# Patient Record
Sex: MALE | Race: WHITE | NOT HISPANIC OR LATINO | Employment: PART TIME | URBAN - NONMETROPOLITAN AREA
[De-identification: names, ages, dates, MRNs, and addresses within clinical notes are randomized per-mention and may not be internally consistent; named-entity substitution may affect disease eponyms.]

---

## 2017-04-01 ENCOUNTER — HOSPITAL ENCOUNTER (EMERGENCY)
Facility: HOSPITAL | Age: 27
Discharge: HOME | End: 2017-04-01
Attending: EMERGENCY MEDICINE | Admitting: EMERGENCY MEDICINE

## 2017-04-01 ENCOUNTER — APPOINTMENT (OUTPATIENT)
Dept: CT IMAGING | Facility: HOSPITAL | Age: 27
End: 2017-04-01

## 2017-04-01 VITALS
BODY MASS INDEX: 42.66 KG/M2 | SYSTOLIC BLOOD PRESSURE: 145 MMHG | RESPIRATION RATE: 16 BRPM | HEART RATE: 92 BPM | HEIGHT: 72 IN | DIASTOLIC BLOOD PRESSURE: 78 MMHG | OXYGEN SATURATION: 98 % | TEMPERATURE: 98.2 F | WEIGHT: 315 LBS

## 2017-04-01 DIAGNOSIS — R31.9 HEMATURIA: ICD-10-CM

## 2017-04-01 DIAGNOSIS — R10.9 ACUTE LEFT FLANK PAIN: Primary | ICD-10-CM

## 2017-04-01 LAB
ALBUMIN SERPL-MCNC: 4.6 G/DL (ref 3.5–5)
ALBUMIN/GLOB SERPL: 1.2 G/DL (ref 1–2)
ALP SERPL-CCNC: 76 U/L (ref 38–126)
ALT SERPL W P-5'-P-CCNC: 49 U/L (ref 13–69)
ANION GAP SERPL CALCULATED.3IONS-SCNC: 14.1 MMOL/L
AST SERPL-CCNC: 33 U/L (ref 15–46)
BACTERIA UR QL AUTO: ABNORMAL /HPF
BASOPHILS # BLD AUTO: 0.06 10*3/MM3 (ref 0–0.2)
BASOPHILS NFR BLD AUTO: 0.7 % (ref 0–2.5)
BILIRUB SERPL-MCNC: 1.1 MG/DL (ref 0.2–1.3)
BILIRUB UR QL STRIP: NEGATIVE
BUN BLD-MCNC: 11 MG/DL (ref 7–20)
BUN/CREAT SERPL: 12.2 (ref 6.3–21.9)
CALCIUM SPEC-SCNC: 9.6 MG/DL (ref 8.4–10.2)
CHLORIDE SERPL-SCNC: 104 MMOL/L (ref 98–107)
CLARITY UR: CLEAR
CO2 SERPL-SCNC: 29 MMOL/L (ref 26–30)
COLOR UR: YELLOW
CREAT BLD-MCNC: 0.9 MG/DL (ref 0.6–1.3)
DEPRECATED RDW RBC AUTO: 38.5 FL (ref 37–54)
EOSINOPHIL # BLD AUTO: 0.12 10*3/MM3 (ref 0–0.7)
EOSINOPHIL NFR BLD AUTO: 1.4 % (ref 0–7)
ERYTHROCYTE [DISTWIDTH] IN BLOOD BY AUTOMATED COUNT: 12.6 % (ref 11.5–14.5)
GFR SERPL CREATININE-BSD FRML MDRD: 102 ML/MIN/1.73
GLOBULIN UR ELPH-MCNC: 3.8 GM/DL
GLUCOSE BLD-MCNC: 83 MG/DL (ref 74–98)
GLUCOSE UR STRIP-MCNC: NEGATIVE MG/DL
HCT VFR BLD AUTO: 42.6 % (ref 42–52)
HGB BLD-MCNC: 14.8 G/DL (ref 14–18)
HGB UR QL STRIP.AUTO: ABNORMAL
HYALINE CASTS UR QL AUTO: ABNORMAL /LPF
IMM GRANULOCYTES # BLD: 0.04 10*3/MM3 (ref 0–0.06)
IMM GRANULOCYTES NFR BLD: 0.5 % (ref 0–0.6)
KETONES UR QL STRIP: NEGATIVE
LEUKOCYTE ESTERASE UR QL STRIP.AUTO: NEGATIVE
LYMPHOCYTES # BLD AUTO: 1.48 10*3/MM3 (ref 0.6–3.4)
LYMPHOCYTES NFR BLD AUTO: 16.9 % (ref 10–50)
MCH RBC QN AUTO: 29.5 PG (ref 27–31)
MCHC RBC AUTO-ENTMCNC: 34.7 G/DL (ref 30–37)
MCV RBC AUTO: 84.9 FL (ref 80–94)
MONOCYTES # BLD AUTO: 0.88 10*3/MM3 (ref 0–0.9)
MONOCYTES NFR BLD AUTO: 10 % (ref 0–12)
NEUTROPHILS # BLD AUTO: 6.2 10*3/MM3 (ref 2–6.9)
NEUTROPHILS NFR BLD AUTO: 70.5 % (ref 37–80)
NITRITE UR QL STRIP: NEGATIVE
NRBC BLD MANUAL-RTO: 0 /100 WBC (ref 0–0)
PH UR STRIP.AUTO: 8.5 [PH] (ref 5–8)
PLATELET # BLD AUTO: 264 10*3/MM3 (ref 130–400)
PMV BLD AUTO: 9.3 FL (ref 6–12)
POTASSIUM BLD-SCNC: 4.1 MMOL/L (ref 3.5–5.1)
PROT SERPL-MCNC: 8.4 G/DL (ref 6.3–8.2)
PROT UR QL STRIP: ABNORMAL
RBC # BLD AUTO: 5.02 10*6/MM3 (ref 4.7–6.1)
RBC # UR: ABNORMAL /HPF
REF LAB TEST METHOD: ABNORMAL
SODIUM BLD-SCNC: 143 MMOL/L (ref 137–145)
SP GR UR STRIP: 1.02 (ref 1–1.03)
SQUAMOUS #/AREA URNS HPF: ABNORMAL /HPF
UROBILINOGEN UR QL STRIP: ABNORMAL
WBC NRBC COR # BLD: 8.78 10*3/MM3 (ref 4.8–10.8)
WBC UR QL AUTO: ABNORMAL /HPF

## 2017-04-01 PROCEDURE — 99283 EMERGENCY DEPT VISIT LOW MDM: CPT

## 2017-04-01 PROCEDURE — 85025 COMPLETE CBC W/AUTO DIFF WBC: CPT | Performed by: PHYSICIAN ASSISTANT

## 2017-04-01 PROCEDURE — 74176 CT ABD & PELVIS W/O CONTRAST: CPT

## 2017-04-01 PROCEDURE — 25010000002 KETOROLAC TROMETHAMINE PER 15 MG: Performed by: PHYSICIAN ASSISTANT

## 2017-04-01 PROCEDURE — 80053 COMPREHEN METABOLIC PANEL: CPT | Performed by: PHYSICIAN ASSISTANT

## 2017-04-01 PROCEDURE — 96361 HYDRATE IV INFUSION ADD-ON: CPT

## 2017-04-01 PROCEDURE — 81001 URINALYSIS AUTO W/SCOPE: CPT | Performed by: EMERGENCY MEDICINE

## 2017-04-01 PROCEDURE — 96374 THER/PROPH/DIAG INJ IV PUSH: CPT

## 2017-04-01 RX ORDER — SODIUM CHLORIDE 0.9 % (FLUSH) 0.9 %
10 SYRINGE (ML) INJECTION AS NEEDED
Status: DISCONTINUED | OUTPATIENT
Start: 2017-04-01 | End: 2017-04-01 | Stop reason: HOSPADM

## 2017-04-01 RX ORDER — KETOROLAC TROMETHAMINE 30 MG/ML
30 INJECTION, SOLUTION INTRAMUSCULAR; INTRAVENOUS ONCE
Status: COMPLETED | OUTPATIENT
Start: 2017-04-01 | End: 2017-04-01

## 2017-04-01 RX ORDER — ONDANSETRON 4 MG/1
4 TABLET, FILM COATED ORAL EVERY 6 HOURS PRN
Qty: 10 TABLET | Refills: 0 | Status: SHIPPED | OUTPATIENT
Start: 2017-04-01

## 2017-04-01 RX ORDER — NAPROXEN 500 MG/1
500 TABLET ORAL 2 TIMES DAILY PRN
Qty: 14 TABLET | Refills: 0 | Status: SHIPPED | OUTPATIENT
Start: 2017-04-01 | End: 2018-07-21

## 2017-04-01 RX ADMIN — SODIUM CHLORIDE 1000 ML: 9 INJECTION, SOLUTION INTRAVENOUS at 16:16

## 2017-04-01 RX ADMIN — KETOROLAC TROMETHAMINE 30 MG: 30 INJECTION, SOLUTION INTRAMUSCULAR at 16:16

## 2017-04-01 NOTE — DISCHARGE INSTRUCTIONS
Increase fluids, strain urine.  Return to the ER for uncontrolled pain, fever, vomiting.  Follow-up with Dr. Leon, urologist, in 2-3 days for further workup, treatment and evaluation.      Follow-up with your family doctor on Monday.

## 2017-04-01 NOTE — ED PROVIDER NOTES
Subjective   HPI Comments: No history of kidney stones.  Patient sent here for hematuria noted on urinalysis at urgent care as well as left flank pain.    Patient is a 26 y.o. male presenting with flank pain.   History provided by:  Patient  History limited by: nothing.   used: No    Flank Pain   Pain location:  L flank  Pain quality: sharp    Pain radiates to:  LUQ (Left groin region)  Pain severity:  Moderate  Onset quality:  Gradual  Timing:  Constant  Progression:  Waxing and waning  Chronicity:  New  Context: not recent illness    Relieved by:  Nothing  Worsened by:  Nothing  Ineffective treatments:  None tried  Associated symptoms: dysuria, hematuria and nausea    Associated symptoms: no chest pain        Review of Systems   Constitutional: Negative.    HENT: Negative.    Eyes: Negative.    Respiratory: Negative.    Cardiovascular: Negative.  Negative for chest pain.   Gastrointestinal: Positive for abdominal pain and nausea.   Endocrine: Negative.    Genitourinary: Positive for dysuria, flank pain and hematuria.   Skin: Negative.    Allergic/Immunologic: Negative.    Neurological: Negative.    Hematological: Negative.    Psychiatric/Behavioral: Negative.    All other systems reviewed and are negative.      History reviewed. No pertinent past medical history.    No Known Allergies    Past Surgical History:   Procedure Laterality Date   • NEPHRECTOMY PARTIAL      2 years old       Family History   Problem Relation Age of Onset   • Hypertension Mother    • Diabetes Mother    • Heart failure Mother    • Alzheimer's disease Mother        Social History     Social History   • Marital status: Single     Spouse name: N/A   • Number of children: N/A   • Years of education: N/A     Social History Main Topics   • Smoking status: Former Smoker     Quit date: 1/1/2017   • Smokeless tobacco: None   • Alcohol use No   • Drug use: No   • Sexual activity: Not Asked     Other Topics Concern   • None      Social History Narrative   • None           Objective   Physical Exam   Constitutional: He is oriented to person, place, and time. He appears well-developed and well-nourished. No distress.   HENT:   Head: Normocephalic and atraumatic.   Right Ear: External ear normal.   Left Ear: External ear normal.   Eyes: EOM are normal. Pupils are equal, round, and reactive to light.   Neck: Normal range of motion. Neck supple.   Cardiovascular: Normal rate, regular rhythm and normal heart sounds.    Pulmonary/Chest: Effort normal and breath sounds normal. No stridor. He has no wheezes. He exhibits no tenderness.   Abdominal: Soft. He exhibits no distension. There is no tenderness. There is no rebound and no guarding.   Left CVA tenderness noted.   Musculoskeletal: Normal range of motion. He exhibits no edema.   Neurological: He is alert and oriented to person, place, and time.   Skin: Skin is warm and dry. No rash noted. He is not diaphoretic.   Psychiatric: He has a normal mood and affect. His behavior is normal. Judgment and thought content normal.   Nursing note and vitals reviewed.      Procedures         ED Course  ED Course                  MDM  Number of Diagnoses or Management Options  Acute left flank pain: new and requires workup  Hematuria: new and requires workup     Amount and/or Complexity of Data Reviewed  Clinical lab tests: reviewed and ordered  Tests in the radiology section of CPT®: ordered and reviewed  Discuss the patient with other providers: yes    Risk of Complications, Morbidity, and/or Mortality  Presenting problems: moderate  Diagnostic procedures: low  Management options: moderate  General comments: CT scan report noted.  Likely, the patient has passed a ureter stone or has a non-radiopaque ureteral stone present, that hasn't passed yet.  We will have the patient strain his urine and if not improving, follow-up with Dr. Leon for further workup.  The patient verbalized understanding.    Patient  Progress  Patient progress: stable      Final diagnoses:   Acute left flank pain   Hematuria            Breanne Delacruz PA-C  04/01/17 185

## 2017-07-28 DIAGNOSIS — M25.571 RIGHT ANKLE PAIN, UNSPECIFIED CHRONICITY: Primary | ICD-10-CM

## 2017-08-02 ENCOUNTER — OFFICE VISIT (OUTPATIENT)
Dept: ORTHOPEDIC SURGERY | Facility: CLINIC | Age: 27
End: 2017-08-02

## 2017-08-02 VITALS — WEIGHT: 315 LBS | BODY MASS INDEX: 42.66 KG/M2 | HEIGHT: 72 IN | RESPIRATION RATE: 18 BRPM

## 2017-08-02 DIAGNOSIS — M25.571 RIGHT ANKLE PAIN, UNSPECIFIED CHRONICITY: Primary | ICD-10-CM

## 2017-08-02 PROCEDURE — 99203 OFFICE O/P NEW LOW 30 MIN: CPT | Performed by: ORTHOPAEDIC SURGERY

## 2017-08-02 RX ORDER — MELOXICAM 7.5 MG/1
15 TABLET ORAL DAILY
Qty: 60 TABLET | Refills: 2 | Status: SHIPPED | OUTPATIENT
Start: 2017-08-02

## 2017-08-02 NOTE — PROGRESS NOTES
Subjective   Patient ID: Akin De Oliveira is a 27 y.o. male  Pain and Edema of the Right Ankle (Patient denies any injury. Patient stated about two months ago began experiencing pain and swelling; also a large knot. 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  Used to play catcher remembers maybe injuring his ankle once in high school but no other injuries recently.  Denies other joint arthralgias.  Was treated the past for possible kidney stones.    Review of Systems   Constitutional: Negative for diaphoresis, fever and unexpected weight change.   HENT: Negative for dental problem and sore throat.    Eyes: Negative for visual disturbance.   Respiratory: Negative for shortness of breath.    Cardiovascular: Negative for chest pain.   Gastrointestinal: Negative for abdominal pain, constipation, diarrhea, nausea and vomiting.   Genitourinary: Negative for difficulty urinating and frequency.   Musculoskeletal: Positive for arthralgias.   Neurological: Negative for headaches.   Hematological: Does not bruise/bleed easily.   All other systems reviewed and are negative.      History reviewed. No pertinent past medical history.     Past Surgical History:   Procedure Laterality Date   • NEPHRECTOMY PARTIAL      2 years old left       Family History   Problem Relation Age of Onset   • Hypertension Mother    • Diabetes Mother    • Heart failure Mother    • Alzheimer's disease Mother        Social History     Social History   • Marital status: Single     Spouse name: N/A   • Number of children: N/A   • Years of education: N/A     Occupational History   • Not on file.     Social History Main Topics   • Smoking status: Former Smoker     Quit date: 1/1/2017   • Smokeless tobacco: Not on file   • Alcohol use No   • Drug  "use: No   • Sexual activity: Defer     Other Topics Concern   • Not on file     Social History Narrative       No Known Allergies    Objective   Resp 18  Ht 72\" (182.9 cm)  Wt (!) 350 lb 9.6 oz (159 kg)  BMI 47.55 kg/m2   Physical Exam  Constitutional: He is oriented to person, place, and time. He appears well-developed and well-nourished.   HENT:   Head: Normocephalic and atraumatic.   Eyes: EOM are normal. Pupils are equal, round, and reactive to light.   Neck: Normal range of motion. Neck supple.   Cardiovascular: Normal rate.    Pulmonary/Chest: Effort normal and breath sounds normal.   Abdominal: Soft.   Neurological: He is alert and oriented to person, place, and time.   Skin: Skin is warm and dry.   Psychiatric: He has a normal mood and affect.   Nursing note and vitals reviewed.       Ortho Exam   Some tenderness and swelling medial to the anterior tibialis tendon good motor control negative instability full range of motion skin appears normal Supple Achilles tendon nontender peroneal nerve and peroneal tendon function intact and normal no pain with subtalar range of motion    Assessment/Plan   Review of Radiographic Studies:    Radiographic images today of affected area I personally viewed and showed no sign of acute fracture or dislocation.      Procedures     Akin was seen today for pain and edema.    Diagnoses and all orders for this visit:    Right ankle pain, unspecified chronicity  -     MRI Ankle Right Without Contrast; Future  -     Ambulatory Referral to Physical Therapy Evaluate and treat    Other orders  -     meloxicam (MOBIC) 7.5 MG tablet; Take 2 tablets by mouth Daily.     Physical therapy referral given and Use brace as instructed      Recommendations/Plan:   Work/Activity Status: May perform usual activities as tolerated    Patient agreeable to call or return sooner for any concerns.             Impression:  Anteromedial right ankle pain probable tendinitis    Plan: Physical therapy, " lace up ankle support, Mobic, MRI right ankle

## 2018-07-21 ENCOUNTER — HOSPITAL ENCOUNTER (EMERGENCY)
Facility: HOSPITAL | Age: 28
Discharge: HOME | End: 2018-07-22
Attending: EMERGENCY MEDICINE | Admitting: EMERGENCY MEDICINE

## 2018-07-21 ENCOUNTER — APPOINTMENT (OUTPATIENT)
Dept: GENERAL RADIOLOGY | Facility: HOSPITAL | Age: 28
End: 2018-07-21

## 2018-07-21 DIAGNOSIS — R07.9 CHEST PAIN, UNSPECIFIED TYPE: Primary | ICD-10-CM

## 2018-07-21 LAB
ALBUMIN SERPL-MCNC: 4.7 G/DL (ref 3.5–5)
ALBUMIN/GLOB SERPL: 1.2 G/DL (ref 1–2)
ALP SERPL-CCNC: 79 U/L (ref 38–126)
ALT SERPL W P-5'-P-CCNC: 58 U/L (ref 13–69)
ANION GAP SERPL CALCULATED.3IONS-SCNC: 14.5 MMOL/L (ref 10–20)
AST SERPL-CCNC: 55 U/L (ref 15–46)
BASOPHILS # BLD AUTO: 0.08 10*3/MM3 (ref 0–0.2)
BASOPHILS NFR BLD AUTO: 0.7 % (ref 0–2.5)
BILIRUB SERPL-MCNC: 0.8 MG/DL (ref 0.2–1.3)
BUN BLD-MCNC: 13 MG/DL (ref 7–20)
BUN/CREAT SERPL: 16.3 (ref 6.3–21.9)
CALCIUM SPEC-SCNC: 9.5 MG/DL (ref 8.4–10.2)
CHLORIDE SERPL-SCNC: 103 MMOL/L (ref 98–107)
CO2 SERPL-SCNC: 26 MMOL/L (ref 26–30)
CREAT BLD-MCNC: 0.8 MG/DL (ref 0.6–1.3)
D-DIMER, QUANTITATIVE (MAD,POW, STR): 323 NG/ML (FEU) (ref 0–500)
DEPRECATED RDW RBC AUTO: 39.4 FL (ref 37–54)
EOSINOPHIL # BLD AUTO: 0.2 10*3/MM3 (ref 0–0.7)
EOSINOPHIL NFR BLD AUTO: 1.8 % (ref 0–7)
ERYTHROCYTE [DISTWIDTH] IN BLOOD BY AUTOMATED COUNT: 12.7 % (ref 11.5–14.5)
GFR SERPL CREATININE-BSD FRML MDRD: 116 ML/MIN/1.73
GLOBULIN UR ELPH-MCNC: 4 GM/DL
GLUCOSE BLD-MCNC: 90 MG/DL (ref 74–98)
HCT VFR BLD AUTO: 42.5 % (ref 42–52)
HGB BLD-MCNC: 14.6 G/DL (ref 14–18)
IMM GRANULOCYTES # BLD: 0.03 10*3/MM3 (ref 0–0.06)
IMM GRANULOCYTES NFR BLD: 0.3 % (ref 0–0.6)
LYMPHOCYTES # BLD AUTO: 2.17 10*3/MM3 (ref 0.6–3.4)
LYMPHOCYTES NFR BLD AUTO: 20 % (ref 10–50)
MCH RBC QN AUTO: 29.4 PG (ref 27–31)
MCHC RBC AUTO-ENTMCNC: 34.4 G/DL (ref 30–37)
MCV RBC AUTO: 85.7 FL (ref 80–94)
MONOCYTES # BLD AUTO: 1.14 10*3/MM3 (ref 0–0.9)
MONOCYTES NFR BLD AUTO: 10.5 % (ref 0–12)
NEUTROPHILS # BLD AUTO: 7.24 10*3/MM3 (ref 2–6.9)
NEUTROPHILS NFR BLD AUTO: 66.7 % (ref 37–80)
NRBC BLD MANUAL-RTO: 0 /100 WBC (ref 0–0)
PLATELET # BLD AUTO: 281 10*3/MM3 (ref 130–400)
PMV BLD AUTO: 9.5 FL (ref 6–12)
POTASSIUM BLD-SCNC: 4.5 MMOL/L (ref 3.5–5.1)
PROT SERPL-MCNC: 8.7 G/DL (ref 6.3–8.2)
RBC # BLD AUTO: 4.96 10*6/MM3 (ref 4.7–6.1)
SODIUM BLD-SCNC: 139 MMOL/L (ref 137–145)
TROPONIN I SERPL-MCNC: <0.012 NG/ML (ref 0–0.03)
WBC NRBC COR # BLD: 10.86 10*3/MM3 (ref 4.8–10.8)

## 2018-07-21 PROCEDURE — 99283 EMERGENCY DEPT VISIT LOW MDM: CPT

## 2018-07-21 PROCEDURE — 93005 ELECTROCARDIOGRAM TRACING: CPT

## 2018-07-21 PROCEDURE — 85025 COMPLETE CBC W/AUTO DIFF WBC: CPT | Performed by: EMERGENCY MEDICINE

## 2018-07-21 PROCEDURE — 96372 THER/PROPH/DIAG INJ SC/IM: CPT

## 2018-07-21 PROCEDURE — 25010000002 KETOROLAC TROMETHAMINE PER 15 MG: Performed by: EMERGENCY MEDICINE

## 2018-07-21 PROCEDURE — 80053 COMPREHEN METABOLIC PANEL: CPT | Performed by: EMERGENCY MEDICINE

## 2018-07-21 PROCEDURE — 85379 FIBRIN DEGRADATION QUANT: CPT | Performed by: EMERGENCY MEDICINE

## 2018-07-21 PROCEDURE — 71046 X-RAY EXAM CHEST 2 VIEWS: CPT

## 2018-07-21 PROCEDURE — 84484 ASSAY OF TROPONIN QUANT: CPT | Performed by: EMERGENCY MEDICINE

## 2018-07-21 RX ORDER — SODIUM CHLORIDE 0.9 % (FLUSH) 0.9 %
10 SYRINGE (ML) INJECTION AS NEEDED
Status: DISCONTINUED | OUTPATIENT
Start: 2018-07-21 | End: 2018-07-22 | Stop reason: HOSPADM

## 2018-07-21 RX ORDER — NAPROXEN 500 MG/1
500 TABLET ORAL 2 TIMES DAILY
Qty: 14 TABLET | Refills: 0 | Status: SHIPPED | OUTPATIENT
Start: 2018-07-21

## 2018-07-21 RX ORDER — KETOROLAC TROMETHAMINE 30 MG/ML
30 INJECTION, SOLUTION INTRAMUSCULAR; INTRAVENOUS ONCE
Status: COMPLETED | OUTPATIENT
Start: 2018-07-21 | End: 2018-07-21

## 2018-07-21 RX ADMIN — KETOROLAC TROMETHAMINE 30 MG: 30 INJECTION, SOLUTION INTRAMUSCULAR at 23:18

## 2018-07-22 VITALS
HEIGHT: 72 IN | TEMPERATURE: 98.4 F | WEIGHT: 315 LBS | SYSTOLIC BLOOD PRESSURE: 146 MMHG | RESPIRATION RATE: 18 BRPM | DIASTOLIC BLOOD PRESSURE: 97 MMHG | HEART RATE: 89 BPM | OXYGEN SATURATION: 99 % | BODY MASS INDEX: 42.66 KG/M2

## 2018-07-22 LAB
HOLD SPECIMEN: NORMAL
WHOLE BLOOD HOLD SPECIMEN: NORMAL
WHOLE BLOOD HOLD SPECIMEN: NORMAL

## 2019-09-04 ENCOUNTER — HOSPITAL ENCOUNTER (EMERGENCY)
Facility: HOSPITAL | Age: 29
Discharge: HOME | End: 2019-09-04
Attending: EMERGENCY MEDICINE | Admitting: EMERGENCY MEDICINE

## 2019-09-04 VITALS
HEART RATE: 104 BPM | BODY MASS INDEX: 42.66 KG/M2 | DIASTOLIC BLOOD PRESSURE: 76 MMHG | HEIGHT: 72 IN | SYSTOLIC BLOOD PRESSURE: 141 MMHG | RESPIRATION RATE: 19 BRPM | WEIGHT: 315 LBS | OXYGEN SATURATION: 97 % | TEMPERATURE: 98.2 F

## 2019-09-04 DIAGNOSIS — S81.011A KNEE LACERATION, RIGHT, INITIAL ENCOUNTER: Primary | ICD-10-CM

## 2019-09-04 PROCEDURE — 90471 IMMUNIZATION ADMIN: CPT | Performed by: EMERGENCY MEDICINE

## 2019-09-04 PROCEDURE — 90715 TDAP VACCINE 7 YRS/> IM: CPT | Performed by: EMERGENCY MEDICINE

## 2019-09-04 PROCEDURE — 25010000002 TDAP 5-2.5-18.5 LF-MCG/0.5 SUSPENSION: Performed by: EMERGENCY MEDICINE

## 2019-09-04 PROCEDURE — 99283 EMERGENCY DEPT VISIT LOW MDM: CPT

## 2019-09-04 RX ORDER — LIDOCAINE HYDROCHLORIDE AND EPINEPHRINE 10; 10 MG/ML; UG/ML
10 INJECTION, SOLUTION INFILTRATION; PERINEURAL ONCE
Status: COMPLETED | OUTPATIENT
Start: 2019-09-04 | End: 2019-09-04

## 2019-09-04 RX ADMIN — TETANUS TOXOID, REDUCED DIPHTHERIA TOXOID AND ACELLULAR PERTUSSIS VACCINE, ADSORBED 0.5 ML: 5; 2.5; 8; 8; 2.5 SUSPENSION INTRAMUSCULAR at 03:27

## 2019-09-04 RX ADMIN — LIDOCAINE HYDROCHLORIDE,EPINEPHRINE BITARTRATE 10 ML: 10; .01 INJECTION, SOLUTION INFILTRATION; PERINEURAL at 04:06

## 2019-09-04 NOTE — ED PROVIDER NOTES
Subjective   29-year-old male presents for evaluation of right knee laceration.  Approximately 2 hours ago, the patient was at work when he sustained an accidental laceration to the anterior aspect of his right knee on a metal cart.  Isolated injury.  No difficulty walking.  No paresthesias.  He was advised by his medical provider at work to come to the emergency department as he likely needed stitches.  Unknown tetanus status.        History provided by:  Patient  Laceration   Location:  Leg  Leg laceration location:  R knee  Bleeding: controlled    Time since incident:  2 hours  Injury mechanism: sharp edge of a seat.  Foreign body present:  No foreign bodies  Tetanus status:  Unknown      Review of Systems   Musculoskeletal: Negative for gait problem.   Skin: Positive for wound (laceration to the right knee).   All other systems reviewed and are negative.      No past medical history on file.    No Known Allergies    Past Surgical History:   Procedure Laterality Date   • NEPHRECTOMY PARTIAL      2 years old left       Family History   Problem Relation Age of Onset   • Hypertension Mother    • Diabetes Mother    • Heart failure Mother    • Alzheimer's disease Mother        Social History     Socioeconomic History   • Marital status: Single     Spouse name: Not on file   • Number of children: Not on file   • Years of education: Not on file   • Highest education level: Not on file   Tobacco Use   • Smoking status: Former Smoker     Last attempt to quit: 2017     Years since quittin.6   Substance and Sexual Activity   • Alcohol use: No   • Drug use: No   • Sexual activity: Defer         Objective   Physical Exam   Constitutional: He is oriented to person, place, and time. He appears well-developed and well-nourished. No distress.   Well-appearing male in no acute distress   HENT:   Head: Normocephalic and atraumatic.   Cardiovascular: Normal rate, regular rhythm and normal heart sounds. Exam reveals no gallop  and no friction rub.   No murmur heard.  Pulmonary/Chest: Effort normal and breath sounds normal. No respiratory distress. He has no wheezes. He has no rales.   Musculoskeletal: Normal range of motion.   Right lower extremity neurovascularly intact distally with bounding distal pulses normal sensation, normal range of motion of right knee   Neurological: He is alert and oriented to person, place, and time.   Normal gait   Skin: He is not diaphoretic.   Approximately 2 cm laceration noted to anterior aspect of right knee without active bleeding   Psychiatric: He has a normal mood and affect. Judgment and thought content normal.   Nursing note and vitals reviewed.      Laceration Repair  Date/Time: 9/4/2019 4:02 AM  Performed by: César Barrientos MD  Authorized by: César Barrientos MD     Consent:     Consent obtained:  Verbal    Consent given by:  Patient    Risks discussed:  Infection, need for additional repair, nerve damage, poor wound healing, poor cosmetic result, pain, vascular damage and tendon damage    Alternatives discussed:  No treatment  Anesthesia (see MAR for exact dosages):     Anesthesia method:  Local infiltration    Local anesthetic:  Lidocaine 1% WITH epi  Laceration details:     Location:  Leg    Leg location:  R knee    Length (cm):  2  Repair type:     Repair type:  Simple  Pre-procedure details:     Preparation:  Patient was prepped and draped in usual sterile fashion  Exploration:     Contaminated: no    Treatment:     Area cleansed with:  Saline    Amount of cleaning:  Extensive    Irrigation solution:  Sterile saline    Irrigation volume:  500    Irrigation method:  Syringe    Visualized foreign bodies/material removed: no    Skin repair:     Repair method:  Sutures    Suture size:  3-0    Suture material:  Nylon    Suture technique:  Simple interrupted    Number of sutures:  3  Approximation:     Approximation:  Close    Vermilion border: well-aligned    Post-procedure details:      "Dressing:  Antibiotic ointment    Patient tolerance of procedure:  Tolerated well, no immediate complications             ED Course  ED Course as of Sep 04 0402   Wed Sep 04, 2019   0337 29-year-old male presents for evaluation of accidental right knee laceration sustained approximately 2 hours prior to coming to the emergency department at work.  He accidentally cut it on a metal cart.  On arrival to the ED, patient well-appearing.  Tetanus updated.  Isolated injury.  Normal gait.  Right lower extremity neurovascularly intact.  We will copiously irrigate the patient's wound and repair with sutures.  Wound is superficial and clean.  No antibiotics indicated.  No imaging indicated.  [DD]   0401 Wound copiously irrigated and repaired without complication.  No antibiotics indicated.  Reassured and counseled regarding wound management.  The patient will follow up with his primary care physician in 10 to 14 days for suture removal.  Agreeable with plan and given appropriate strict return precautions.  [DD]      ED Course User Index  [DD] César Barrientos MD         Final Diagnosis: as of Sep 04 0402   Knee laceration, right, initial encounter       No results found for this or any previous visit (from the past 24 hour(s)).  Note: In addition to lab results from this visit, the labs listed above may include labs taken at another facility or during a different encounter within the last 24 hours. Please correlate lab times with ED admission and discharge times for further clarification of the services performed during this visit.    No orders to display     Vitals:    09/04/19 0241   BP: 141/76   BP Location: Left arm   Patient Position: Sitting   Pulse: 104   Resp: 19   Temp: 98.2 °F (36.8 °C)   TempSrc: Oral   SpO2: 97%   Weight: (!) 163 kg (360 lb)   Height: 182.9 cm (72\")     Medications   lidocaine-EPINEPHrine (XYLOCAINE W/EPI) 1 %-1:038138 injection 10 mL (not administered)   Tdap (BOOSTRIX) injection 0.5 mL (not " "administered)     ECG/EMG Results (last 24 hours)     ** No results found for the last 24 hours. **        No orders to display                 No results found for this or any previous visit (from the past 24 hour(s)).  Note: In addition to lab results from this visit, the labs listed above may include labs taken at another facility or during a different encounter within the last 24 hours. Please correlate lab times with ED admission and discharge times for further clarification of the services performed during this visit.    No orders to display     Vitals:    09/04/19 0241   BP: 141/76   BP Location: Left arm   Patient Position: Sitting   Pulse: 104   Resp: 19   Temp: 98.2 °F (36.8 °C)   TempSrc: Oral   SpO2: 97%   Weight: (!) 163 kg (360 lb)   Height: 182.9 cm (72\")     Medications   lidocaine-EPINEPHrine (XYLOCAINE W/EPI) 1 %-1:153544 injection 10 mL (not administered)   Tdap (BOOSTRIX) injection 0.5 mL (0.5 mL Intramuscular Given 9/4/19 0327)     ECG/EMG Results (last 24 hours)     ** No results found for the last 24 hours. **        No orders to display           MDM    Final diagnoses:   Knee laceration, right, initial encounter       Documentation assistance provided by tia García.  Information recorded by the scribmisael was done at my direction and has been verified and validated by me.     Cha García  09/04/19 0301       César Barrientos MD  09/04/19 0406    "

## 2019-09-04 NOTE — DISCHARGE INSTRUCTIONS
Follow up with your primary care provider in 10-15 days for suture removal.    Return to the ER if you develop any new or worsening symptoms.    Please review the medications you are supposed to be taking according to prior physician recommendations. I have not changed your home medications during this visit. If your discharge instructions indicate that I have changed your home medications, this is not the case, and you should disregard. If you have any questions about the medication you should be taking at home, please call your physician.

## 2024-05-01 ENCOUNTER — OFFICE VISIT (OUTPATIENT)
Dept: PULMONOLOGY | Facility: CLINIC | Age: 34
End: 2024-05-01
Payer: COMMERCIAL

## 2024-05-01 ENCOUNTER — PATIENT ROUNDING (BHMG ONLY) (OUTPATIENT)
Dept: PULMONOLOGY | Facility: CLINIC | Age: 34
End: 2024-05-01
Payer: COMMERCIAL

## 2024-05-01 VITALS
SYSTOLIC BLOOD PRESSURE: 138 MMHG | HEIGHT: 73 IN | WEIGHT: 315 LBS | RESPIRATION RATE: 18 BRPM | BODY MASS INDEX: 41.75 KG/M2 | OXYGEN SATURATION: 95 % | DIASTOLIC BLOOD PRESSURE: 78 MMHG | HEART RATE: 120 BPM

## 2024-05-01 DIAGNOSIS — G47.8 SLEEP TALKING: ICD-10-CM

## 2024-05-01 DIAGNOSIS — R06.83 SNORING: ICD-10-CM

## 2024-05-01 DIAGNOSIS — G47.33 OBSTRUCTIVE SLEEP APNEA: Primary | ICD-10-CM

## 2024-05-01 DIAGNOSIS — G47.52 DREAM ENACTMENT BEHAVIOR: ICD-10-CM

## 2024-05-01 DIAGNOSIS — E66.01 MORBID OBESITY, UNSPECIFIED OBESITY TYPE: ICD-10-CM

## 2024-05-01 DIAGNOSIS — G47.19 EXCESSIVE DAYTIME SLEEPINESS: ICD-10-CM

## 2024-05-01 PROCEDURE — 99204 OFFICE O/P NEW MOD 45 MIN: CPT | Performed by: INTERNAL MEDICINE

## 2024-05-01 RX ORDER — ESCITALOPRAM OXALATE 10 MG/1
TABLET ORAL
COMMUNITY
Start: 2021-10-01

## 2024-05-01 RX ORDER — METOPROLOL SUCCINATE 50 MG/1
50 TABLET, EXTENDED RELEASE ORAL
COMMUNITY
Start: 2024-04-08 | End: 2024-07-07

## 2024-05-01 RX ORDER — LOSARTAN POTASSIUM AND HYDROCHLOROTHIAZIDE 25; 100 MG/1; MG/1
1 TABLET ORAL DAILY
COMMUNITY
Start: 2024-04-08 | End: 2024-07-07

## 2024-05-01 NOTE — PROGRESS NOTES
CONSULT NOTE    Requested by:   Maximiliano Velez MD Fowles, Thomas W, MD      Chief Complaint   Patient presents with    Sleeping Problem       Subjective:  Akin De Oliveira is a 33 y.o. male.   Patient came in today for evaluation of possible sleep apnea. Patient endorses loud snoring. he also says that he has been tired and has fatigue during the day, for the past few years.     The patient's family notes that he has occasional pauses in the breathing & he occasionally wakes up gasping for breath.     The patient says that he rarely gets restful night sleep and his quality has diminished considerably. he does have a tendency to feel sleepy while watching TV and reading a book.      The patient has been told that he occasionally talks in his sleep. he also occasionally acts out his dreams.    he is complaining of occasional headaches. There is known family history of sleep apnea, in his mother.    Patient suffers from hypertension.     he drinks 1-2 cups/cans of caffeinated drinks per day.      The following portions of the patient's history were reviewed and updated as appropriate: allergies, current medications, past family history, past medical history, past social history, and past surgical history.    Review of Systems   HENT:  Negative for sinus pressure, sneezing and sore throat.    Respiratory:  Negative for cough, chest tightness, shortness of breath and wheezing.    Cardiovascular:  Negative for palpitations and leg swelling.   Psychiatric/Behavioral:  Positive for sleep disturbance.    All other systems reviewed and are negative.      Past Medical History:   Diagnosis Date    Hypertension        Social History     Tobacco Use    Smoking status: Former     Current packs/day: 0.00     Types: Cigarettes     Quit date: 2017     Years since quittin.3    Smokeless tobacco: Not on file   Substance Use Topics    Alcohol use: No         Objective:  Visit Vitals  /78   Pulse 120   Resp 18   Ht 185.4  "cm (73\") Comment: pt reported   Wt (!) 213 kg (469 lb)   SpO2 95%   BMI 61.88 kg/m²       Physical Exam  Vitals reviewed.   Constitutional:       Appearance: He is well-developed.   HENT:      Head: Atraumatic.      Mouth/Throat:      Mouth: Mucous membranes are moist.      Comments: Oropharynx was crowded.  Eyes:      Pupils: Pupils are equal, round, and reactive to light.   Neck:      Thyroid: No thyromegaly.      Vascular: No JVD.      Trachea: No tracheal deviation.   Cardiovascular:      Rate and Rhythm: Normal rate and regular rhythm.   Pulmonary:      Effort: Pulmonary effort is normal. No respiratory distress.      Breath sounds: Normal breath sounds. No wheezing.   Musculoskeletal:      Right lower leg: No edema.      Left lower leg: No edema.      Comments: Gait was normal.   Skin:     General: Skin is warm and dry.   Neurological:      Mental Status: He is alert and oriented to person, place, and time.           Assessment/Plan:  Diagnoses and all orders for this visit:    1. Obstructive sleep apnea (Primary)  -     Polysomnography 4 or More Parameters; Future    2. Snoring  -     Polysomnography 4 or More Parameters; Future    3. Excessive daytime sleepiness  -     Polysomnography 4 or More Parameters; Future    4. Morbid obesity, unspecified obesity type  -     Polysomnography 4 or More Parameters; Future    5. Dream enactment behavior  -     Polysomnography 4 or More Parameters; Future    6. Sleep talking  -     Polysomnography 4 or More Parameters; Future        Return in about 6 months (around 10/17/2024) for SleepONLY/Cherise.    DISCUSSION(if any):  I have also reviewed his primary care provider's last note that mentions sleep apnea and morbid obesity.     ===========================  ===========================    Laboratory workup also showed   Lab Results   Component Value Date    HGB 14.6 07/21/2018    HGB 14.8 04/01/2017   ,   Lab Results   Component Value Date    HCT 42.5 07/21/2018    HCT " 42.6 04/01/2017       Lab Results   Component Value Date    EOSABS 0.20 07/21/2018    EOSABS 0.12 04/01/2017    & Laboratory workup also showed   Lab Results   Component Value Date    CO2 26.0 07/21/2018     ===========================  ===========================    Sleep questionnaire was provided to the patient.    The pathophysiology of sleep apnea was discussed, with him.     We will encourage him to schedule the sleep study soon.     He already had a home study, done by his PCP, and was told that it was inconclusive.     The patient will definitely need to be considered for an in lab study due to inconclusive recent home sleep study, dream enactment and sleep talking among other reasons.  It should be noted that a home sleep study is likely to underestimate the true AHI and is unable to assess sleep stages and abnormal sleep behaviors etc. The patient has understood.    The patient is agreeable to try CPAP/BiPAP, if needed.     Patient was educated on good sleep hygiene measures and voiced understanding of the same.     Patient was given reading material regarding sleep apnea.      Dictated utilizing Dragon dictation.    This document was electronically signed by Joan Cueto MD on 05/01/24 at 09:49 EDT

## 2024-05-17 ENCOUNTER — HOSPITAL ENCOUNTER (OUTPATIENT)
Dept: SLEEP MEDICINE | Facility: HOSPITAL | Age: 34
End: 2024-05-17
Payer: COMMERCIAL

## 2024-05-17 DIAGNOSIS — R06.83 SNORING: ICD-10-CM

## 2024-05-17 DIAGNOSIS — G47.8 SLEEP TALKING: ICD-10-CM

## 2024-05-17 DIAGNOSIS — E66.01 MORBID OBESITY, UNSPECIFIED OBESITY TYPE: ICD-10-CM

## 2024-05-17 DIAGNOSIS — G47.33 OBSTRUCTIVE SLEEP APNEA: ICD-10-CM

## 2024-05-17 DIAGNOSIS — G47.19 EXCESSIVE DAYTIME SLEEPINESS: ICD-10-CM

## 2024-05-17 DIAGNOSIS — G47.52 DREAM ENACTMENT BEHAVIOR: ICD-10-CM

## 2024-05-17 PROCEDURE — 95811 POLYSOM 6/>YRS CPAP 4/> PARM: CPT

## 2024-09-13 ENCOUNTER — HOSPITAL ENCOUNTER (EMERGENCY)
Facility: HOSPITAL | Age: 34
Discharge: HOME | End: 2024-09-14
Attending: STUDENT IN AN ORGANIZED HEALTH CARE EDUCATION/TRAINING PROGRAM
Payer: COMMERCIAL

## 2024-09-13 ENCOUNTER — APPOINTMENT (OUTPATIENT)
Dept: GENERAL RADIOLOGY | Facility: HOSPITAL | Age: 34
End: 2024-09-13
Payer: COMMERCIAL

## 2024-09-13 DIAGNOSIS — R07.9 CHEST PAIN, UNSPECIFIED TYPE: Primary | ICD-10-CM

## 2024-09-13 LAB
ALBUMIN SERPL-MCNC: 4.3 G/DL (ref 3.5–5.2)
ALBUMIN/GLOB SERPL: 1.2 G/DL
ALP SERPL-CCNC: 81 U/L (ref 39–117)
ALT SERPL W P-5'-P-CCNC: 53 U/L (ref 1–41)
ANION GAP SERPL CALCULATED.3IONS-SCNC: 11.2 MMOL/L (ref 5–15)
AST SERPL-CCNC: 39 U/L (ref 1–40)
BASOPHILS # BLD AUTO: 0.12 10*3/MM3 (ref 0–0.2)
BASOPHILS NFR BLD AUTO: 1.2 % (ref 0–1.5)
BILIRUB SERPL-MCNC: 0.3 MG/DL (ref 0–1.2)
BUN SERPL-MCNC: 11 MG/DL (ref 6–20)
BUN/CREAT SERPL: 11.7 (ref 7–25)
CALCIUM SPEC-SCNC: 9.5 MG/DL (ref 8.6–10.5)
CHLORIDE SERPL-SCNC: 100 MMOL/L (ref 98–107)
CO2 SERPL-SCNC: 25.8 MMOL/L (ref 22–29)
CREAT SERPL-MCNC: 0.94 MG/DL (ref 0.76–1.27)
D DIMER PPP FEU-MCNC: 0.4 MCGFEU/ML (ref 0–0.5)
DEPRECATED RDW RBC AUTO: 40.3 FL (ref 37–54)
EGFRCR SERPLBLD CKD-EPI 2021: 109.1 ML/MIN/1.73
EOSINOPHIL # BLD AUTO: 1.06 10*3/MM3 (ref 0–0.4)
EOSINOPHIL NFR BLD AUTO: 11 % (ref 0.3–6.2)
ERYTHROCYTE [DISTWIDTH] IN BLOOD BY AUTOMATED COUNT: 13 % (ref 12.3–15.4)
GLOBULIN UR ELPH-MCNC: 3.6 GM/DL
GLUCOSE SERPL-MCNC: 133 MG/DL (ref 65–99)
HCT VFR BLD AUTO: 41.3 % (ref 37.5–51)
HGB BLD-MCNC: 14.4 G/DL (ref 13–17.7)
HOLD SPECIMEN: NORMAL
HOLD SPECIMEN: NORMAL
IMM GRANULOCYTES # BLD AUTO: 0.09 10*3/MM3 (ref 0–0.05)
IMM GRANULOCYTES NFR BLD AUTO: 0.9 % (ref 0–0.5)
LYMPHOCYTES # BLD AUTO: 1.97 10*3/MM3 (ref 0.7–3.1)
LYMPHOCYTES NFR BLD AUTO: 20.4 % (ref 19.6–45.3)
MCH RBC QN AUTO: 29.9 PG (ref 26.6–33)
MCHC RBC AUTO-ENTMCNC: 34.9 G/DL (ref 31.5–35.7)
MCV RBC AUTO: 85.7 FL (ref 79–97)
MONOCYTES # BLD AUTO: 1.06 10*3/MM3 (ref 0.1–0.9)
MONOCYTES NFR BLD AUTO: 11 % (ref 5–12)
NEUTROPHILS NFR BLD AUTO: 5.34 10*3/MM3 (ref 1.7–7)
NEUTROPHILS NFR BLD AUTO: 55.5 % (ref 42.7–76)
NRBC BLD AUTO-RTO: 0 /100 WBC (ref 0–0.2)
NT-PROBNP SERPL-MCNC: 40.7 PG/ML (ref 0–450)
PLATELET # BLD AUTO: 311 10*3/MM3 (ref 140–450)
PMV BLD AUTO: 9 FL (ref 6–12)
POTASSIUM SERPL-SCNC: 4.1 MMOL/L (ref 3.5–5.2)
PROT SERPL-MCNC: 7.9 G/DL (ref 6–8.5)
RBC # BLD AUTO: 4.82 10*6/MM3 (ref 4.14–5.8)
SODIUM SERPL-SCNC: 137 MMOL/L (ref 136–145)
TROPONIN T SERPL HS-MCNC: <6 NG/L
WBC NRBC COR # BLD AUTO: 9.64 10*3/MM3 (ref 3.4–10.8)
WHOLE BLOOD HOLD COAG: NORMAL
WHOLE BLOOD HOLD SPECIMEN: NORMAL

## 2024-09-13 PROCEDURE — 71045 X-RAY EXAM CHEST 1 VIEW: CPT

## 2024-09-13 PROCEDURE — 96374 THER/PROPH/DIAG INJ IV PUSH: CPT

## 2024-09-13 PROCEDURE — 85025 COMPLETE CBC W/AUTO DIFF WBC: CPT | Performed by: STUDENT IN AN ORGANIZED HEALTH CARE EDUCATION/TRAINING PROGRAM

## 2024-09-13 PROCEDURE — 99284 EMERGENCY DEPT VISIT MOD MDM: CPT

## 2024-09-13 PROCEDURE — 25810000003 SODIUM CHLORIDE 0.9 % SOLUTION: Performed by: STUDENT IN AN ORGANIZED HEALTH CARE EDUCATION/TRAINING PROGRAM

## 2024-09-13 PROCEDURE — 84484 ASSAY OF TROPONIN QUANT: CPT | Performed by: STUDENT IN AN ORGANIZED HEALTH CARE EDUCATION/TRAINING PROGRAM

## 2024-09-13 PROCEDURE — 85379 FIBRIN DEGRADATION QUANT: CPT | Performed by: STUDENT IN AN ORGANIZED HEALTH CARE EDUCATION/TRAINING PROGRAM

## 2024-09-13 PROCEDURE — 93005 ELECTROCARDIOGRAM TRACING: CPT | Performed by: STUDENT IN AN ORGANIZED HEALTH CARE EDUCATION/TRAINING PROGRAM

## 2024-09-13 PROCEDURE — 25010000002 KETOROLAC TROMETHAMINE PER 15 MG: Performed by: STUDENT IN AN ORGANIZED HEALTH CARE EDUCATION/TRAINING PROGRAM

## 2024-09-13 PROCEDURE — 83880 ASSAY OF NATRIURETIC PEPTIDE: CPT | Performed by: STUDENT IN AN ORGANIZED HEALTH CARE EDUCATION/TRAINING PROGRAM

## 2024-09-13 PROCEDURE — 80053 COMPREHEN METABOLIC PANEL: CPT | Performed by: STUDENT IN AN ORGANIZED HEALTH CARE EDUCATION/TRAINING PROGRAM

## 2024-09-13 RX ORDER — NITROGLYCERIN 0.4 MG/1
0.4 TABLET SUBLINGUAL
COMMUNITY

## 2024-09-13 RX ORDER — KETOROLAC TROMETHAMINE 30 MG/ML
30 INJECTION, SOLUTION INTRAMUSCULAR; INTRAVENOUS ONCE
Status: COMPLETED | OUTPATIENT
Start: 2024-09-13 | End: 2024-09-13

## 2024-09-13 RX ORDER — PANTOPRAZOLE SODIUM 40 MG/1
40 TABLET, DELAYED RELEASE ORAL DAILY
COMMUNITY
Start: 2024-07-10 | End: 2024-10-08

## 2024-09-13 RX ADMIN — SODIUM CHLORIDE 500 ML: 9 INJECTION, SOLUTION INTRAVENOUS at 22:40

## 2024-09-13 RX ADMIN — KETOROLAC TROMETHAMINE 30 MG: 30 INJECTION, SOLUTION INTRAMUSCULAR; INTRAVENOUS at 22:43

## 2024-09-13 NOTE — Clinical Note
[facility] EMERGENCY DEPARTMENT  [address] [address]  Phone: [phone]    Akin De Oliveira was seen and treated in our emergency department on 9/13/2024.  He may return to work on 09/16/2024.         Thank you for choosing [facility].    Sukhjinder Connell,

## 2024-09-14 VITALS
DIASTOLIC BLOOD PRESSURE: 95 MMHG | OXYGEN SATURATION: 99 % | SYSTOLIC BLOOD PRESSURE: 142 MMHG | HEART RATE: 95 BPM | HEIGHT: 72 IN | TEMPERATURE: 98.1 F | RESPIRATION RATE: 20 BRPM | WEIGHT: 315 LBS | BODY MASS INDEX: 42.66 KG/M2

## 2024-09-14 LAB
GEN 5 2HR TROPONIN T REFLEX: <6 NG/L
TROPONIN T DELTA: NORMAL

## 2024-09-14 PROCEDURE — 96374 THER/PROPH/DIAG INJ IV PUSH: CPT

## 2024-09-14 PROCEDURE — 84484 ASSAY OF TROPONIN QUANT: CPT | Performed by: STUDENT IN AN ORGANIZED HEALTH CARE EDUCATION/TRAINING PROGRAM

## 2024-09-14 PROCEDURE — 99284 EMERGENCY DEPT VISIT MOD MDM: CPT

## 2024-09-14 PROCEDURE — 36415 COLL VENOUS BLD VENIPUNCTURE: CPT

## 2024-09-14 RX ORDER — IBUPROFEN 800 MG/1
800 TABLET, FILM COATED ORAL EVERY 8 HOURS PRN
Qty: 15 TABLET | Refills: 0 | Status: SHIPPED | OUTPATIENT
Start: 2024-09-14

## 2024-09-14 NOTE — ED PROVIDER NOTES
[facility] EMERGENCY DEPARTMENT  Emergency Department Encounter  Emergency Medicine Physician Note       Pt Name: Akin De Oliveira  MRN: [id number]  Pt :   1990  Room Number:  1515  Date of encounter:  2024  PCP: Maximiliano Velez MD  ED Physician: Sukhjinder Connell DO    HPI:  Akin De Oliveira is a 34 y.o. male who presents to the ED with chief complaint of chest pain.  Patient reports sudden onset of symptoms around 9:30 PM while he was eating.  Pain is located the middle part of the chest.  Nonradiating.  Described as sharp.  Worse by movement of the torso and left arm.  Intermittent.  Reports associated cough over the past several weeks.  No fever, chills, shortness of breath, back pain, abdominal pain, problems urination problems, leg pain or swelling.    Past medical history of depression anxiety, hypertension, GERD.  No personal history of cardiac disease.  Positive family history of cardiac disease.  Dips tobacco.  .    PAST MEDICAL HISTORY  Past Medical History:   Diagnosis Date    Hypertension      Current Outpatient Medications   Medication Instructions    escitalopram (LEXAPRO) 10 MG tablet     ibuprofen (ADVIL,MOTRIN) 800 mg, Oral, Every 8 Hours PRN    losartan-hydrochlorothiazide (HYZAAR) 100-25 MG per tablet 1 tablet, Oral, Daily    metoprolol succinate XL (TOPROL-XL) 50 mg, Oral    nitroglycerin (NITROSTAT) 0.4 mg, Sublingual, Every 5 Minutes PRN    pantoprazole (PROTONIX) 40 mg, Oral, Daily      PAST SURGICAL HISTORY  Past Surgical History:   Procedure Laterality Date    NEPHRECTOMY PARTIAL      2 years old left       FAMILY HISTORY  Family History   Problem Relation Age of Onset    Hypertension Mother     Diabetes Mother     Heart failure Mother     Alzheimer's disease Mother        SOCIAL HISTORY  Social History     Socioeconomic History    Marital status: Single   Tobacco Use    Smoking status: Former     Current packs/day: 0.00     Types: Cigarettes     Quit  date: 2017     Years since quittin.7   Vaping Use    Vaping status: Never Used   Substance and Sexual Activity    Alcohol use: No    Drug use: No    Sexual activity: Defer     ALLERGIES  Patient has no known allergies.    REVIEW OF SYSTEMS  All systems reviewed and negative except for those discussed in HPI.     PHYSICAL EXAM  ED Triage Vitals [24 2212]   Temp Heart Rate Resp BP SpO2   98.1 °F (36.7 °C) 117 20 (!) 171/126 97 %      Temp src Heart Rate Source Patient Position BP Location FiO2 (%)   Oral Monitor Sitting Left arm --     I have reviewed the triage vital signs and nursing notes.    General: Morbidly obese male.  Alert.  Nontoxic appearance.  No acute distress.  Head: Normocephalic.  Atraumatic.  Eyes: No scleral icterus.  ENT: Moist mucous membranes.  Cardiovascular: Regular rate and rhythm.  No murmurs.  No rubs.  2+ distal pulses bilaterally.  Respiratory: Equal breath sounds bilaterally.  No rales.  No rhonchi.  No wheezing.  GI: Abdomen is soft.  Nondistended.  Nontender to palpation.  No rebound.  No guarding.  No CVA tenderness.  MSK: Moves all 4 extremities.  Reproducible chest wall tenderness over the left anterior lateral chest wall.  Neurologic: Oriented x 3.  No focal deficits.  Skin: No erythema.  No edema. No pallor. No cyanosis.  Psych: Normal mood and affect.    LAB RESULTS  Recent Results (from the past 24 hour(s))   High Sensitivity Troponin T    Collection Time: 24 10:34 PM    Specimen: Blood   Result Value Ref Range    HS Troponin T <6 <22 ng/L   Comprehensive Metabolic Panel    Collection Time: 24 10:34 PM    Specimen: Blood   Result Value Ref Range    Glucose 133 (H) 65 - 99 mg/dL    BUN 11 6 - 20 mg/dL    Creatinine 0.94 0.76 - 1.27 mg/dL    Sodium 137 136 - 145 mmol/L    Potassium 4.1 3.5 - 5.2 mmol/L    Chloride 100 98 - 107 mmol/L    CO2 25.8 22.0 - 29.0 mmol/L    Calcium 9.5 8.6 - 10.5 mg/dL    Total Protein 7.9 6.0 - 8.5 g/dL    Albumin 4.3 3.5 - 5.2  g/dL    ALT (SGPT) 53 (H) 1 - 41 U/L    AST (SGOT) 39 1 - 40 U/L    Alkaline Phosphatase 81 39 - 117 U/L    Total Bilirubin 0.3 0.0 - 1.2 mg/dL    Globulin 3.6 gm/dL    A/G Ratio 1.2 g/dL    BUN/Creatinine Ratio 11.7 7.0 - 25.0    Anion Gap 11.2 5.0 - 15.0 mmol/L    eGFR 109.1 >60.0 mL/min/1.73   BNP    Collection Time: 09/13/24 10:34 PM    Specimen: Blood   Result Value Ref Range    proBNP 40.7 0.0 - 450.0 pg/mL   Green Top (Gel)    Collection Time: 09/13/24 10:34 PM   Result Value Ref Range    Extra Tube Hold for add-ons.    Lavender Top    Collection Time: 09/13/24 10:34 PM   Result Value Ref Range    Extra Tube hold for add-on    Gold Top - SST    Collection Time: 09/13/24 10:34 PM   Result Value Ref Range    Extra Tube Hold for add-ons.    Light Blue Top    Collection Time: 09/13/24 10:34 PM   Result Value Ref Range    Extra Tube Hold for add-ons.    CBC Auto Differential    Collection Time: 09/13/24 10:34 PM    Specimen: Blood   Result Value Ref Range    WBC 9.64 3.40 - 10.80 10*3/mm3    RBC 4.82 4.14 - 5.80 10*6/mm3    Hemoglobin 14.4 13.0 - 17.7 g/dL    Hematocrit 41.3 37.5 - 51.0 %    MCV 85.7 79.0 - 97.0 fL    MCH 29.9 26.6 - 33.0 pg    MCHC 34.9 31.5 - 35.7 g/dL    RDW 13.0 12.3 - 15.4 %    RDW-SD 40.3 37.0 - 54.0 fl    MPV 9.0 6.0 - 12.0 fL    Platelets 311 140 - 450 10*3/mm3    Neutrophil % 55.5 42.7 - 76.0 %    Lymphocyte % 20.4 19.6 - 45.3 %    Monocyte % 11.0 5.0 - 12.0 %    Eosinophil % 11.0 (H) 0.3 - 6.2 %    Basophil % 1.2 0.0 - 1.5 %    Immature Grans % 0.9 (H) 0.0 - 0.5 %    Neutrophils, Absolute 5.34 1.70 - 7.00 10*3/mm3    Lymphocytes, Absolute 1.97 0.70 - 3.10 10*3/mm3    Monocytes, Absolute 1.06 (H) 0.10 - 0.90 10*3/mm3    Eosinophils, Absolute 1.06 (H) 0.00 - 0.40 10*3/mm3    Basophils, Absolute 0.12 0.00 - 0.20 10*3/mm3    Immature Grans, Absolute 0.09 (H) 0.00 - 0.05 10*3/mm3    nRBC 0.0 0.0 - 0.2 /100 WBC   D-dimer, Quantitative    Collection Time: 09/13/24 11:02 PM    Specimen: Blood    Result Value Ref Range    D-Dimer, Quantitative 0.40 0.00 - 0.50 MCGFEU/mL   High Sensitivity Troponin T 2Hr    Collection Time: 09/14/24 12:09 AM    Specimen: Blood   Result Value Ref Range    HS Troponin T <6 <22 ng/L    Troponin T Delta         RADIOLOGY  XR Chest 1 View    Result Date: 9/14/2024  PROCEDURE: XR CHEST 1 VW-  HISTORY: Chest pain  COMPARISON: July 21, 2018.  FINDINGS: The heart is normal in size. The mediastinum is unremarkable. The lungs are clear. There is no pneumothorax.  There are no acute osseous abnormalities.      No acute cardiopulmonary process.  Continued followup is recommended.    This report was signed and finalized on 9/14/2024 7:41 AM by Jf Barron DO.       PROCEDURES  Procedures    RISK STRATIFICATION  HEART SCORE  History: Slightly Suspicious (0)  ECG: Normal (0)  Age: Less than 45 years (0)  Risk factors: >3 Risks or PMH of ASCVD (2)  Troponin: normal (0)    This patient's HEART score is 2.    According to the HEART Score Study: Score (Risk of adverse cardiac event in the next 14 days)  Scores 0-3: (0.9-1.7%) In the HEART Score study, these patients were discharged home.  Scores 4-6: (12-16.6%)  In the HEART Score study, these patients were admitted to the hospital.  Scores ?7: (50-65%) In the HEART Score study, these patients were candidates for early invasive measures.   Final disposition is based on individual provider judgement and current clinical situation.    MEDICAL DECISION MAKING  34 y.o. male with past medical history listed above who presents with chest pain    Vital signs remarkable for hypertension and tachycardia, otherwise within normal limits.    Based on clinical presentation and physical exam, differential diagnosis includes, but is not limited to, MSK etiology, costochondritis, pneumothorax, pneumonia, acute coronary syndrome.  Unable to rule out pulmonary embolism clinically given tachycardia.  Low clinical suspicion for thoracic aortic dissection.    At  least 3 different tests have been ordered on this patient.    Please see ED course below for my interpretation of the ED workup.  ED Course as of 09/14/24 1621   Fri Sep 13, 2024   2234 ECG 12 Lead Chest Pain  EKG per my interpretation sinus tachycardia, rate 114, normal axis, no ST segment elevation or depression, normal QRS QTC intervals.   [JS]   Sat Sep 14, 2024   0101 I reviewed the labs listed above. Clinically unremarkable.    Notable findings are highlighted below.    Old laboratory data was reviewed from the medical records and compared to today's results.   [JS]   0101 CBC & Differential(!) [JS]   0102 Comprehensive Metabolic Panel(!) [JS]   0102 ALT (SGPT)(!): 53 [JS]   0102 proBNP: 40.7 [JS]   0102 D-Dimer, Quant: 0.40 [JS]   0102 HS Troponin T: <6 [JS]   0102 High Sensitivity Troponin T 2Hr [JS]   0102 HS Troponin T: <6 [JS]   0102 XR Chest 1 View  I have independently reviewed and interpreted the chest x-ray.  My interpretation is negative for infiltrate or pneumothorax.   [JS]      ED Course User Index  [JS] Sukhjinder Connell DO     Medications administered in ED:  Medications   ketorolac (TORADOL) injection 30 mg (30 mg Intravenous Given 9/13/24 2243)   sodium chloride 0.9 % bolus 500 mL (0 mL Intravenous Stopped 9/14/24 0057)     On re-evaluation, patient resting comfortably.  States symptoms have resolved following therapy. Denies active chest pain. Vital signs remained stable on room air.  Patient was ambulatory in the ED with steady gait.  Able to tolerate oral intake appropriately.    I discussed the findings of the ED workup with the patient at bedside. HEART score 2 listed above. No clinical indication for admission.  I recommended outpatient follow-up with PCP/cardiology.  Patient was deemed medically stable for discharge with close outpatient follow-up and strict ED return precautions. Patient agreeable with plan and disposition.    Home medications were reviewed.  Prescriptions for  discharge: Ibuprofen    Chronic conditions affecting care: Morbid obesity    Social determinants of health impacting treatment or disposition: None    REPEAT VITAL SIGNS  AS OF 16:21 EDT VITALS:  BP - 142/95  HR - 95  TEMP - 98.1 °F (36.7 °C) (Oral)  O2 SATS - 99%    DIAGNOSIS  Final diagnoses:   Chest pain, unspecified type     DISPOSITION  ED Disposition       ED Disposition   Discharge    Condition   Stable    Comment   --               PATIENT REFERRALS  [facility] CARDIOLOGY  [address] [address] [phone]        Maximiliano Velez MD  [address] [facility] [address]  [phone]      PCP. 48 hours.            Please note that portions of this document were completed with voice recognition software.        Sukhjinder Connell DO  09/14/24 2649

## 2024-11-19 ENCOUNTER — OFFICE VISIT (OUTPATIENT)
Dept: FAMILY MEDICINE CLINIC | Facility: CLINIC | Age: 34
End: 2024-11-19
Payer: COMMERCIAL

## 2024-11-19 VITALS
TEMPERATURE: 97.9 F | DIASTOLIC BLOOD PRESSURE: 86 MMHG | WEIGHT: 315 LBS | HEART RATE: 100 BPM | SYSTOLIC BLOOD PRESSURE: 138 MMHG | BODY MASS INDEX: 42.66 KG/M2 | OXYGEN SATURATION: 97 % | RESPIRATION RATE: 16 BRPM | HEIGHT: 72 IN

## 2024-11-19 DIAGNOSIS — R00.0 TACHYCARDIA: ICD-10-CM

## 2024-11-19 DIAGNOSIS — I10 PRIMARY HYPERTENSION: ICD-10-CM

## 2024-11-19 DIAGNOSIS — Z11.59 NEED FOR HEPATITIS C SCREENING TEST: ICD-10-CM

## 2024-11-19 DIAGNOSIS — N39.44 NOCTURNAL ENURESIS: ICD-10-CM

## 2024-11-19 DIAGNOSIS — E66.01 CLASS 3 SEVERE OBESITY DUE TO EXCESS CALORIES WITH BODY MASS INDEX (BMI) OF 60.0 TO 69.9 IN ADULT, UNSPECIFIED WHETHER SERIOUS COMORBIDITY PRESENT: ICD-10-CM

## 2024-11-19 DIAGNOSIS — E66.813 CLASS 3 SEVERE OBESITY DUE TO EXCESS CALORIES WITH BODY MASS INDEX (BMI) OF 60.0 TO 69.9 IN ADULT, UNSPECIFIED WHETHER SERIOUS COMORBIDITY PRESENT: ICD-10-CM

## 2024-11-19 DIAGNOSIS — K21.00 GASTROESOPHAGEAL REFLUX DISEASE WITH ESOPHAGITIS WITHOUT HEMORRHAGE: ICD-10-CM

## 2024-11-19 DIAGNOSIS — R73.9 HYPERGLYCEMIA: ICD-10-CM

## 2024-11-19 DIAGNOSIS — R68.82 DECREASED LIBIDO: ICD-10-CM

## 2024-11-19 DIAGNOSIS — F17.220 CHEWING TOBACCO NICOTINE DEPENDENCE WITHOUT COMPLICATION: ICD-10-CM

## 2024-11-19 DIAGNOSIS — Z23 NEED FOR INFLUENZA VACCINATION: ICD-10-CM

## 2024-11-19 DIAGNOSIS — G47.33 OSA (OBSTRUCTIVE SLEEP APNEA): ICD-10-CM

## 2024-11-19 DIAGNOSIS — Z00.00 WELL ADULT EXAM: Primary | ICD-10-CM

## 2024-11-19 DIAGNOSIS — F33.9 RECURRENT MAJOR DEPRESSIVE DISORDER, REMISSION STATUS UNSPECIFIED: ICD-10-CM

## 2024-11-19 PROCEDURE — 99385 PREV VISIT NEW AGE 18-39: CPT | Performed by: FAMILY MEDICINE

## 2024-11-19 PROCEDURE — 90471 IMMUNIZATION ADMIN: CPT | Performed by: FAMILY MEDICINE

## 2024-11-19 PROCEDURE — 90656 IIV3 VACC NO PRSV 0.5 ML IM: CPT | Performed by: FAMILY MEDICINE

## 2024-11-19 PROCEDURE — 99214 OFFICE O/P EST MOD 30 MIN: CPT | Performed by: FAMILY MEDICINE

## 2024-11-19 RX ORDER — BUPROPION HYDROCHLORIDE 150 MG/1
150 TABLET ORAL DAILY
Qty: 30 TABLET | Refills: 0 | Status: SHIPPED | OUTPATIENT
Start: 2024-11-19

## 2024-11-19 RX ORDER — FAMOTIDINE 20 MG/1
20 TABLET, FILM COATED ORAL 2 TIMES DAILY
Qty: 180 TABLET | Refills: 1 | Status: SHIPPED | OUTPATIENT
Start: 2024-11-19

## 2024-11-19 RX ORDER — CARVEDILOL 6.25 MG/1
6.25 TABLET ORAL 2 TIMES DAILY WITH MEALS
Qty: 60 TABLET | Refills: 0 | Status: SHIPPED | OUTPATIENT
Start: 2024-11-19

## 2024-11-19 RX ORDER — LOSARTAN POTASSIUM 100 MG/1
100 TABLET ORAL DAILY
Qty: 30 TABLET | Refills: 0 | Status: SHIPPED | OUTPATIENT
Start: 2024-11-19

## 2024-11-19 NOTE — PATIENT INSTRUCTIONS
Your provider at [facility] has just given you a general check-up. Your health care team recommends the following prescriptions for a healthy future:    Diet  The right foods will help you live a longer, healthier life. Many diseases such as heart disease, diabetes, and high blood pressure can be prevented or controlled through a healthy diet. Eat a variety of foods. Maintain a healthy weight. Choose a diet low in carbohydrates and processed foods and higher in protein. Eat plenty of fresh vegetables and fruits. Use sugar only in moderation.    Exercise  All kinds of exercise will help you feel better and maintain a healthy weight. Regular exercise will also help you control your blood pressure and cholesterol, and strengthen your heart and muscles. Even daily activities such as housework, walking, or raking leaves will help. Pick activities that you enjoy, that fit into your routine and that you can do with a friend. Try for a total of 30 minutes per day, 5 days a week. Include exercises designed to improve flexibility and balance.    Obesity  Maintain a healthy weight. Obesity is a risk factor for medical problems including diabetes, heart disease, high blood pressure, and high cholesterol. You will find helpful weight loss & exercise information on the National Institutes of Health Website: AIM for a Healthy Weight.    Don’t Smoke  If you smoke, quit. It is the best thing that you can do to stay healthy. Pick a day to quit. If you fail the first time, don’t give up. Keep trying and learn from your experience. You can succeed and live a longer, healthier life.    Alcohol and Drug Abuse  If you drink alcohol, do so only in moderation - no more than 1 drink daily for women or 2 drinks daily for men. Do not drink at all if you are pregnant or may be in the future. Do not drink alcohol before or while driving a motor vehicle. Don’t use illegal (street) drugs of any kind, at any time. Use  prescription drugs only as directed. Use non-prescription drugs only as instructed on the label.    HIV / AIDS Prevention  You can reduce your risk of getting HIV by not having sex, by having sex with only one, mutually faithful, uninfected partner or by using a latex condom correctly every time you have sex. You can reduce your risk of getting HIV by not using illegal (street) drugs or sharing needles and syringes.    Breast Cancer Screening  All women should begin having mammograms every 1-2 years by age 40. Check with your doctor to determine when you should start having routine mammography. You should check your own breasts regularly for problems.    Cervical Cancer Screening  Human papillomavirus (HPV) infection and smoking are risk factors for cervical cancer. Sexually active women between 21 and 65 years of age should be screened with a PAP smear at least every 1-3 years.    Colon Cancer Screening  You should have colon screening if you are 45 years of age or older. Earlier screening may be indicated if you have had colon polyps, a family history of colon cancer, familial polyposis or have had breast, ovarian or uterine cancer yourself.     Skin Cancer  Use sun protective measures such as hats, shirts and staying in the shade. Use sunscreen when you can’t avoid the sun. Avoid artificial tanning lights.    Osteoporosis Prevention  For women over 65 and others at risk of osteoporosis, your diet should include Calcium (9091-1868) & Vitamin D (800 IU) daily.    Folic Acid  All women who are capable of becoming pregnant should consume folic acid 0.8 mg daily to reduce risk of birth defects of the brain and spinal cord.    Occupational Health  Safety officers and human resources representatives at your job can assist you in techniques to reduce repetitive motion and lifting injuries. Be alert for hazards in your workplace and follow all safety rules.    Violence  Physical/mental abuse of children, spouses, partners  and the elderly is an all too common occurrence. Please ask for help if you feel you may be the victim or perpetrator of this abuse.    Immunizations  Immunizations (“shots”) may prevent serious diseases. At age 65 you should consider a pneumococcal (“pneumonia”) shot as well as an influenza (“flu”) shot every year. Adults with some chronic medical conditions may need to start pneumococcal and influenza shots earlier. College bound students who plan on living in dorms should consider the meningococcal vaccine unless they’ve been immunized already. The Tdap vaccine (Tetanus-diphtheria-acellular pertussis) is recommended by the CDC every 10 years between 11-64 years. The Varicella vaccine is also recommended by the CDC if there is no history of prior infection.    Injury Prevention Tips  --Always wear safety belts while in a car.  --Never drive after drinking alcohol.  --Always wear a safety helmet while riding on a motorcycle, bicycle or all-terrain vehicle.  --Use smoke detectors in your home. Change the batteries every year and check to see if they work every month.  --If you have firearms in the home make sure that the firearm and the ammunition are locked up.  --Prevent falls by older adults. Repair slippery or uneven walking surfaces, improve poor lighting and install secure railings on all stairways.  --Secure all chemicals and pools from children.    Advance Directives  You have the right under State Law to make decisions concerning medical care, including the right to sign an Advance Directive to physicians and/or a durable Medical Power of .

## 2024-11-19 NOTE — PROGRESS NOTES
Male Physical Note      Date: 2024   Patient Name: Akin De Oliveira  : 1990   MRN: [id number]     Chief Complaint:    Chief Complaint   Patient presents with    Establish Care     Pt is here to establish care. Dental is UTD vision is not UTD. Pt has bladder concerns and stopped taking depression medication in March. Pt has just now restarted it about two weeks ago and is working well.        History of Present Illness: Akin De Oliveira is a 34 y.o. male who is here today for their annual health maintenance and physical.  Patient has a few concerns today including nocturnal enuresis, hypertension, obesity.  He is here with his fiancée who helps to contribute to history.    Nocturnal enuresis -has been ongoing for few months.  Worse in the last month.  Patient states that it happens 4-5 times over the last 2 weeks.  Urine does have odor to it but no pain with urination, no blood.  No dysuria in the daytime.  Patient currently on thiazide diuretic for blood pressure medication.  Patient does not describe any dreams when he sleeps.  Does have history of diagnosed sleep apnea but did not follow-up with appointment to get CPAP.    Hypertension -previously diagnosed and worked clinic and patient started on combination losartan hydrochlorothiazide.  He was also started on metoprolol due to tachycardia.  Referred to cardiology but has not established with cardiology yet.  Had 1 instance of chest pain and was evaluated by the ER with unremarkable workup.  Patient does use chewing tobacco and is a former smoker.    Obesity -diet is poor.  Lots of carbohydrates.  Patient does not get any exercise outside of work.  Currently works second/third shift from 3 PM to 3 AM.  Typically will eat when he gets home and stay up for a few hours before going to bed.  He is able to typically get 8 hours of sleep daily but snores and has witnessed episodes of apnea as noted with previous diagnosis with SEVERIANO.    Subjective      I have  reviewed the following portions of the patient's history and these were updated and discussed with the patient as appropriate: past family history, past medical history, past social history, past surgical history, and problem list.      Medications:     Current Outpatient Medications:     escitalopram (LEXAPRO) 10 MG tablet, , Disp: , Rfl:     nitroglycerin (NITROSTAT) 0.4 MG SL tablet, Place 1 tablet under the tongue Every 5 (Five) Minutes As Needed., Disp: , Rfl:     buPROPion XL (Wellbutrin XL) 150 MG 24 hr tablet, Take 1 tablet by mouth Daily., Disp: 30 tablet, Rfl: 0    carvedilol (Coreg) 6.25 MG tablet, Take 1 tablet by mouth 2 (Two) Times a Day With Meals., Disp: 60 tablet, Rfl: 0    famotidine (Pepcid) 20 MG tablet, Take 1 tablet by mouth 2 (Two) Times a Day., Disp: 180 tablet, Rfl: 1    losartan (Cozaar) 100 MG tablet, Take 1 tablet by mouth Daily., Disp: 30 tablet, Rfl: 0    Tirzepatide-Weight Management (ZEPBOUND) 2.5 MG/0.5ML solution auto-injector, Inject 0.5 mL under the skin into the appropriate area as directed 1 (One) Time Per Week., Disp: 2 mL, Rfl: 0    Allergies:   No Known Allergies    Immunizations:  Immunization History   Administered Date(s) Administered    COVID-19 (MODERNA) 1st,2nd,3rd Dose Monovalent 09/10/2021    Fluzone  >6mos 11/19/2024    Tdap 09/04/2019      Sexual health: Sexually active with fiancé.  Has low libido that seems to have gotten worse over the last few months.    PHQ-9 Depression Screening  Little interest or pleasure in doing things? Not at all   Feeling down, depressed, or hopeless? Not at all   PHQ-2 Total Score 0   Trouble falling or staying asleep, or sleeping too much?     Feeling tired or having little energy?     Poor appetite or overeating?     Feeling bad about yourself - or that you are a failure or have let yourself or your family down?     Trouble concentrating on things, such as reading the newspaper or watching television?     Moving or speaking so slowly  "that other people could have noticed? Or the opposite - being so fidgety or restless that you have been moving around a lot more than usual?     Thoughts that you would be better off dead, or of hurting yourself in some way?     PHQ-9 Total Score     If you checked off any problems, how difficult have these problems made it for you to do your work, take care of things at home, or get along with other people? Not difficult at all     Objective     Physical Exam:  Vital Signs:   Vitals:    11/19/24 1310   BP: 138/86   Pulse: 100   Resp: 16   Temp: 97.9 °F (36.6 °C)   TempSrc: Temporal   SpO2: 97%   Weight: (!) 211 kg (465 lb)   Height: 182.9 cm (72\")     Body mass index is 63.07 kg/m².     Physical Exam  Vitals reviewed.   Constitutional:       General: He is not in acute distress.     Appearance: Normal appearance. He is morbidly obese. He is not ill-appearing.   HENT:      Head: Normocephalic and atraumatic.      Right Ear: Tympanic membrane, ear canal and external ear normal.      Left Ear: Tympanic membrane, ear canal and external ear normal.      Nose: Nose normal. No congestion or rhinorrhea.      Mouth/Throat:      Mouth: Mucous membranes are moist.      Pharynx: No oropharyngeal exudate or posterior oropharyngeal erythema.   Eyes:      General: No scleral icterus.        Right eye: No discharge.         Left eye: No discharge.      Extraocular Movements: Extraocular movements intact.      Conjunctiva/sclera: Conjunctivae normal.   Cardiovascular:      Rate and Rhythm: Normal rate and regular rhythm.      Heart sounds: Normal heart sounds. No murmur heard.  Pulmonary:      Effort: Pulmonary effort is normal. No respiratory distress.      Breath sounds: Normal breath sounds. No stridor. No wheezing, rhonchi or rales.   Chest:      Chest wall: No tenderness.   Abdominal:      General: Bowel sounds are normal. There is no distension.      Palpations: Abdomen is soft. There is no mass.      Tenderness: There is no " abdominal tenderness. There is no guarding or rebound.      Hernia: No hernia is present.   Musculoskeletal:         General: Normal range of motion.      Cervical back: Normal range of motion and neck supple. No rigidity.      Right lower leg: Edema present.      Left lower leg: Edema present.   Skin:     General: Skin is warm and dry.   Neurological:      Mental Status: He is alert and oriented to person, place, and time. Mental status is at baseline.   Psychiatric:         Mood and Affect: Mood normal.         Behavior: Behavior normal.         Thought Content: Thought content normal.         Judgment: Judgment normal.         Assessment / Plan      Assessment/Plan:   Diagnoses and all orders for this visit:    1. Well adult exam (Primary)  -     Comprehensive Metabolic Panel  -     CBC (No Diff)  -     Lipid Panel    2. Need for influenza vaccination  -     Fluzone >6mos (0927-4471)    3. Class 3 severe obesity due to excess calories with body mass index (BMI) of 60.0 to 69.9 in adult, unspecified whether serious comorbidity present  -     Tirzepatide-Weight Management (ZEPBOUND) 2.5 MG/0.5ML solution auto-injector; Inject 0.5 mL under the skin into the appropriate area as directed 1 (One) Time Per Week.  Dispense: 2 mL; Refill: 0    4. Need for hepatitis C screening test  -     Hepatitis C Antibody    5. Primary hypertension  -     losartan (Cozaar) 100 MG tablet; Take 1 tablet by mouth Daily.  Dispense: 30 tablet; Refill: 0  -     carvedilol (Coreg) 6.25 MG tablet; Take 1 tablet by mouth 2 (Two) Times a Day With Meals.  Dispense: 60 tablet; Refill: 0    6. SEVERIANO (obstructive sleep apnea)    7. Nocturnal enuresis  -     Urinalysis With Microscopic - Urine, Clean Catch    8. Gastroesophageal reflux disease with esophagitis without hemorrhage  -     famotidine (Pepcid) 20 MG tablet; Take 1 tablet by mouth 2 (Two) Times a Day.  Dispense: 180 tablet; Refill: 1    9. Decreased libido    10. Recurrent major depressive  disorder, remission status unspecified  -     buPROPion XL (Wellbutrin XL) 150 MG 24 hr tablet; Take 1 tablet by mouth Daily.  Dispense: 30 tablet; Refill: 0    11. Hyperglycemia  -     Hemoglobin A1c    12. Tachycardia  -     TSH Rfx On Abnormal To Free T4  -     carvedilol (Coreg) 6.25 MG tablet; Take 1 tablet by mouth 2 (Two) Times a Day With Meals.  Dispense: 60 tablet; Refill: 0    13. Chewing tobacco nicotine dependence without complication    Labs as per above  Long discussion regarding changing diet and exercise habits to help with weight loss  Nocturnal enuresis likely multifactorial and SEVERIANO may be contributing to this  Will stop thiazide diuretic due to patient's nocturnal enuresis.  However due to patient's hypertension will need to start additional medication.  Patient denies any symptoms associated with hypertension.  Will continue losartan 100 mg daily and replace metoprolol and hydrochlorothiazide with carvedilol as this will have better blood pressure control as well as help with patient's tachycardia  Patient advised to follow-up with cardiology  Patient advised to follow-up with pulmonology for SEVERIANO.  Treatment of SEVERIANO will also help with patient's hypertension  Continue ambulatory monitoring of blood pressure  Tobacco cessation advised  Will also limit fluid intake prior to bed as this has helped patient previously  Patient has history of GERD.  Previously prescribed Protonix but he states that it made him feel sick and tired.  Will try H2 blocker.  Medication sent to the pharmacy  Patient has decreased libido likely multifactorial due to morbid obesity, SEVERIANO, and medications.  Patient only recently restarted his Cipro and has only been taking it for 2 weeks.  Will stop Lexapro and start Wellbutrin  Patient indicates that he  is extremely interested in medications to help start his weight loss, and may consider bariatric surgery in the future. We discussed options like phentermine as well as  tirzepatide. As a class of medications, GLP-1 agonists are extremely beneficial to aid in weight loss as well as keeping metabolic diseases such as diabetes under control and reducing overall cardiovascular risk.  Akin may also benefit from tirzepatide which has FDA approval as an obesity medication. he should expect weight loss of 5-10% with the initiation of this medication. Akin has no contraindications at this time as he does not have a personal or family history of MEN2A or medullary thyroid cancer, pancreatitis, or cholecystitis.  This patient is at high risk for developing diabetes and would benefit from starting a medication like tirzepatide for the benefits of preventing diabetes or other comorbid conditions as result of morbid obesity rather than waiting until developing one of these complications. Akin is highly motivated and understands that this medication is an adjunct that will help him achieve a higher quality of life with wellness and a healthy weight.  Initiate and adjust dose using the following schedule: In patients who do not tolerate a dosage increase, consider delaying the increase for an additional 4 weeks:  Week 1 through week 4 : 2.5 mg once weekly.  Week 5 through week 8: 5 mg once weekly.  Week 9 through week 12: 7.5 mg once weekly.  Week 13 through week 16: 10 mg once weekly.  Week 17 through week 20: 12.5 mg once weekly  Week 21 and thereafter (maintenance dosage): 15 mg once weekly  Reassess at 3 months for 5-10% weight loss.  PA initiated at my office but was denied.  Patient will need to trial something like diethylpropion or phentermine first.  Will work to get patient's blood pressure under control with the medications prior to starting stimulant medication.    Class 3 Severe Obesity (BMI >=40). Obesity-related health conditions include the following: obstructive sleep apnea, hypertension, and GERD. Obesity is newly identified. BMI is is above average; BMI management plan is  completed. We discussed low calorie, low carb based diet program, portion control, increasing exercise, joining a fitness center or start home based exercise program, Weight Watchers or other Commercial based weight reduction program, consulting a Bariatric surgeon, management of depression/anxiety/stress to control compensatory eating, decreasing alcohol consumption, pharmacologic options including Tirzepatide, an pratima-based approach such as 1jiajie Pal or Lose It, and Information on healthy weight added to patient's after visit summary.       Follow Up:   Return in about 1 month (around 12/19/2024) for Recheck HTN.    Healthcare Maintenance:   Counseling provided on exercise, nutrition, age-appropriate screening test, and appropriate lab studies.   Akin Alvino voices understanding and acceptance of this advice and will call back with any further questions or concerns. AVS with preventive healthcare tips printed for patient.     Bryant Warren MD   [facility] [place] [facility]

## 2024-11-20 ENCOUNTER — PATIENT ROUNDING (BHMG ONLY) (OUTPATIENT)
Dept: FAMILY MEDICINE CLINIC | Facility: CLINIC | Age: 34
End: 2024-11-20
Payer: COMMERCIAL

## 2024-11-20 ENCOUNTER — PRIOR AUTHORIZATION (OUTPATIENT)
Dept: FAMILY MEDICINE CLINIC | Facility: CLINIC | Age: 34
End: 2024-11-20
Payer: COMMERCIAL

## 2024-11-20 DIAGNOSIS — I10 PRIMARY HYPERTENSION: ICD-10-CM

## 2024-11-20 DIAGNOSIS — R00.0 TACHYCARDIA: ICD-10-CM

## 2024-11-20 LAB
ALBUMIN SERPL-MCNC: 4.2 G/DL (ref 3.5–5.2)
ALBUMIN/GLOB SERPL: 1.2 G/DL
ALP SERPL-CCNC: 76 U/L (ref 39–117)
ALT SERPL-CCNC: 46 U/L (ref 1–41)
APPEARANCE UR: CLEAR
AST SERPL-CCNC: 29 U/L (ref 1–40)
BACTERIA #/AREA URNS HPF: NORMAL /HPF
BILIRUB SERPL-MCNC: 0.4 MG/DL (ref 0–1.2)
BILIRUB UR QL STRIP: NEGATIVE
BUN SERPL-MCNC: 13 MG/DL (ref 6–20)
BUN/CREAT SERPL: 16.3 (ref 7–25)
CALCIUM SERPL-MCNC: 9.7 MG/DL (ref 8.6–10.5)
CASTS URNS MICRO: NORMAL
CHLORIDE SERPL-SCNC: 101 MMOL/L (ref 98–107)
CHOLEST SERPL-MCNC: 159 MG/DL (ref 0–200)
CO2 SERPL-SCNC: 26 MMOL/L (ref 22–29)
COLOR UR: YELLOW
CREAT SERPL-MCNC: 0.8 MG/DL (ref 0.76–1.27)
EGFRCR SERPLBLD CKD-EPI 2021: 119.1 ML/MIN/1.73
EPI CELLS #/AREA URNS HPF: NORMAL /HPF
ERYTHROCYTE [DISTWIDTH] IN BLOOD BY AUTOMATED COUNT: 12.9 % (ref 12.3–15.4)
GLOBULIN SER CALC-MCNC: 3.4 GM/DL
GLUCOSE SERPL-MCNC: 111 MG/DL (ref 65–99)
GLUCOSE UR QL STRIP: NEGATIVE
HBA1C MFR BLD: 6.4 % (ref 4.8–5.6)
HCT VFR BLD AUTO: 44.6 % (ref 37.5–51)
HCV IGG SERPL QL IA: NON REACTIVE
HDLC SERPL-MCNC: 34 MG/DL (ref 40–60)
HGB BLD-MCNC: 15.1 G/DL (ref 13–17.7)
HGB UR QL STRIP: NEGATIVE
KETONES UR QL STRIP: NEGATIVE
LDLC SERPL CALC-MCNC: 106 MG/DL (ref 0–100)
LEUKOCYTE ESTERASE UR QL STRIP: NEGATIVE
MCH RBC QN AUTO: 29.9 PG (ref 26.6–33)
MCHC RBC AUTO-ENTMCNC: 33.9 G/DL (ref 31.5–35.7)
MCV RBC AUTO: 88.3 FL (ref 79–97)
NITRITE UR QL STRIP: NEGATIVE
PH UR STRIP: 6 [PH] (ref 5–8)
PLATELET # BLD AUTO: 316 10*3/MM3 (ref 140–450)
POTASSIUM SERPL-SCNC: 4.5 MMOL/L (ref 3.5–5.2)
PROT SERPL-MCNC: 7.6 G/DL (ref 6–8.5)
PROT UR QL STRIP: NEGATIVE
RBC # BLD AUTO: 5.05 10*6/MM3 (ref 4.14–5.8)
RBC #/AREA URNS HPF: NORMAL /HPF
SODIUM SERPL-SCNC: 137 MMOL/L (ref 136–145)
SP GR UR STRIP: 1.02 (ref 1–1.03)
TRIGL SERPL-MCNC: 101 MG/DL (ref 0–150)
TSH SERPL DL<=0.005 MIU/L-ACNC: 1.99 UIU/ML (ref 0.27–4.2)
UROBILINOGEN UR STRIP-MCNC: NORMAL MG/DL
VLDLC SERPL CALC-MCNC: 19 MG/DL (ref 5–40)
WBC # BLD AUTO: 7.96 10*3/MM3 (ref 3.4–10.8)
WBC #/AREA URNS HPF: NORMAL /HPF

## 2024-11-20 RX ORDER — CARVEDILOL 6.25 MG/1
6.25 TABLET ORAL 2 TIMES DAILY WITH MEALS
Qty: 60 TABLET | Refills: 0 | OUTPATIENT
Start: 2024-11-20

## 2024-11-20 NOTE — TELEPHONE ENCOUNTER
Rx Refill Note  Requested Prescriptions     Pending Prescriptions Disp Refills    carvedilol (Coreg) 6.25 MG tablet 60 tablet 0     Sig: Take 1 tablet by mouth 2 (Two) Times a Day With Meals.      Last office visit with prescribing clinician: 11/19/2024   Last telemedicine visit with prescribing clinician: Visit date not found   Next office visit with prescribing clinician: 12/19/2024     [facility] requesting 90 day supply    Heather Drake MA  11/20/24, 11:40 EST

## 2024-11-20 NOTE — TELEPHONE ENCOUNTER
"Per Dr. Warren, I called patient pharmacy to get information on doing a PA on Zepbound for the patient. Pharmacy stated it did not need a PA, that medication will not be covered for patient until patient tries \"Diethylpropion\" and fails first.   Dr. Warren stated he would like patient to be stable on BP medication and at patients next visit they would discuss trying suggested medication.     I spoke to patient. Patient verbalized understanding.  "

## 2024-11-20 NOTE — TELEPHONE ENCOUNTER
This is a new medication for patient.  Needs to be 30-day supply to assess patient's response to medication.  If he does well then we can increase it to 90 days.

## 2024-11-27 ENCOUNTER — PRIOR AUTHORIZATION (OUTPATIENT)
Dept: FAMILY MEDICINE CLINIC | Facility: CLINIC | Age: 34
End: 2024-11-27
Payer: COMMERCIAL

## 2024-11-27 NOTE — TELEPHONE ENCOUNTER
Prior Authorization has been started on Cover My Meds for Losartan (Cozaar)100MG tablets    Waiting on response from insurance       Key:[id number]

## 2024-12-19 ENCOUNTER — OFFICE VISIT (OUTPATIENT)
Dept: FAMILY MEDICINE CLINIC | Facility: CLINIC | Age: 34
End: 2024-12-19
Payer: COMMERCIAL

## 2024-12-19 VITALS
OXYGEN SATURATION: 98 % | HEIGHT: 72 IN | WEIGHT: 315 LBS | BODY MASS INDEX: 42.66 KG/M2 | HEART RATE: 108 BPM | RESPIRATION RATE: 16 BRPM | DIASTOLIC BLOOD PRESSURE: 86 MMHG | TEMPERATURE: 98.2 F | SYSTOLIC BLOOD PRESSURE: 132 MMHG

## 2024-12-19 DIAGNOSIS — L28.2 PRURITIC RASH: ICD-10-CM

## 2024-12-19 DIAGNOSIS — R00.0 TACHYCARDIA: ICD-10-CM

## 2024-12-19 DIAGNOSIS — E66.813 CLASS 3 SEVERE OBESITY DUE TO EXCESS CALORIES WITH BODY MASS INDEX (BMI) OF 60.0 TO 69.9 IN ADULT, UNSPECIFIED WHETHER SERIOUS COMORBIDITY PRESENT: Primary | ICD-10-CM

## 2024-12-19 DIAGNOSIS — L73.9 FOLLICULITIS: ICD-10-CM

## 2024-12-19 DIAGNOSIS — F33.9 RECURRENT MAJOR DEPRESSIVE DISORDER, REMISSION STATUS UNSPECIFIED: ICD-10-CM

## 2024-12-19 DIAGNOSIS — I10 PRIMARY HYPERTENSION: ICD-10-CM

## 2024-12-19 DIAGNOSIS — E66.01 CLASS 3 SEVERE OBESITY DUE TO EXCESS CALORIES WITH BODY MASS INDEX (BMI) OF 60.0 TO 69.9 IN ADULT, UNSPECIFIED WHETHER SERIOUS COMORBIDITY PRESENT: Primary | ICD-10-CM

## 2024-12-19 PROCEDURE — 99417 PROLNG OP E/M EACH 15 MIN: CPT | Performed by: FAMILY MEDICINE

## 2024-12-19 PROCEDURE — 99215 OFFICE O/P EST HI 40 MIN: CPT | Performed by: FAMILY MEDICINE

## 2024-12-19 RX ORDER — HYDROXYZINE HYDROCHLORIDE 25 MG/1
25 TABLET, FILM COATED ORAL 3 TIMES DAILY PRN
Qty: 30 TABLET | Refills: 1 | Status: SHIPPED | OUTPATIENT
Start: 2024-12-19

## 2024-12-19 RX ORDER — DIETHYLPROPION HYDROCHLORIDE 75 MG/1
75 TABLET, EXTENDED RELEASE ORAL DAILY
Qty: 30 EACH | Refills: 0 | Status: SHIPPED | OUTPATIENT
Start: 2024-12-19

## 2024-12-19 RX ORDER — LOSARTAN POTASSIUM 100 MG/1
100 TABLET ORAL DAILY
Qty: 90 TABLET | Refills: 3 | Status: SHIPPED | OUTPATIENT
Start: 2024-12-19

## 2024-12-19 RX ORDER — CEPHALEXIN 500 MG/1
500 CAPSULE ORAL 4 TIMES DAILY
Qty: 28 CAPSULE | Refills: 0 | Status: SHIPPED | OUTPATIENT
Start: 2024-12-19

## 2024-12-19 RX ORDER — CARVEDILOL 6.25 MG/1
6.25 TABLET ORAL 2 TIMES DAILY WITH MEALS
Qty: 180 TABLET | Refills: 3 | Status: SHIPPED | OUTPATIENT
Start: 2024-12-19

## 2024-12-19 RX ORDER — TRIAMCINOLONE ACETONIDE 1 MG/G
1 OINTMENT TOPICAL 2 TIMES DAILY
Qty: 80 G | Refills: 1 | Status: SHIPPED | OUTPATIENT
Start: 2024-12-19

## 2024-12-19 RX ORDER — BUPROPION HYDROCHLORIDE 150 MG/1
150 TABLET ORAL DAILY
Qty: 90 TABLET | Refills: 3 | Status: SHIPPED | OUTPATIENT
Start: 2024-12-19

## 2024-12-19 NOTE — PROGRESS NOTES
"    Office Note     Name: Akin De Oliveira  : 1990   MRN: [id number]     Chief Complaint:  Hypertension (Blood work results, new medication doing well less fatigue now.  Zepbound was not approved by insurance. Pt wants to know if new medications are causing rash on neck )    Subjective     History of Present Illness:  Akin De Oliveira is a 34 y.o. male who presents today for hypertension and obesity follow-up.  He also has new concerns about a rash.  Rash initially started left flank area a few months ago.  He thought it was a heat rash got worse and worse.  Has become itchy and spread up his torso to neck and back.    Answers submitted by the patient for this visit:  Primary Reason for Visit (Submitted on 2024)  What is the primary reason for your visit?: High Blood Pressure  High Blood Pressure Questionnaire (Submitted on 2024)  Chief Complaint: Hypertension  Chronicity: chronic  Onset: more than 1 year ago  Progression since onset: improved  anxiety: Yes  blurred vision: No  chest pain: Yes  headaches: Yes  malaise/fatigue: Yes  orthopnea: No  palpitations: Yes  peripheral edema: No  shortness of breath: Yes  Agents associated with hypertension: no associated agents  Compliance problems: diet, exercise      I have reviewed the following portions of the patient's history and these were updated and discussed with the patient as appropriate: past family history, past medical history, past social history, past surgical history, and problem list.     Objective     Vital Signs  /86   Pulse 108   Temp 98.2 °F (36.8 °C) (Temporal)   Resp 16   Ht 182.9 cm (72\")   Wt (!) 215 kg (475 lb)   SpO2 98%   BMI 64.42 kg/m²   Estimated body mass index is 64.42 kg/m² as calculated from the following:    Height as of this encounter: 182.9 cm (72\").    Weight as of this encounter: 215 kg (475 lb).    Physical Exam  Vitals reviewed.   Constitutional:       General: He is not in acute distress.     Appearance: " Normal appearance. He is morbidly obese. He is not ill-appearing.   HENT:      Head: Normocephalic.   Eyes:      Extraocular Movements: Extraocular movements intact.   Cardiovascular:      Rate and Rhythm: Normal rate.   Pulmonary:      Effort: Pulmonary effort is normal.      Breath sounds: Normal breath sounds.   Skin:     Findings: Rash present. Rash is papular and pustular.          Neurological:      Mental Status: He is alert and oriented to person, place, and time.   Psychiatric:         Mood and Affect: Mood normal.         Behavior: Behavior normal.               Assessment and Plan     Diagnoses and all orders for this visit:    1. Class 3 severe obesity due to excess calories with body mass index (BMI) of 60.0 to 69.9 in adult, unspecified whether serious comorbidity present (Primary)  -     Diethylpropion HCl ER 75 MG tablet sustained-release 24 hour; Take 75 mg by mouth Daily.  Dispense: 30 each; Refill: 0  -     Ambulatory Referral to Bariatric Surgery    2. Recurrent major depressive disorder, remission status unspecified  -     buPROPion XL (Wellbutrin XL) 150 MG 24 hr tablet; Take 1 tablet by mouth Daily.  Dispense: 90 tablet; Refill: 3    3. Primary hypertension  -     carvedilol (Coreg) 6.25 MG tablet; Take 1 tablet by mouth 2 (Two) Times a Day With Meals.  Dispense: 180 tablet; Refill: 3  -     losartan (Cozaar) 100 MG tablet; Take 1 tablet by mouth Daily.  Dispense: 90 tablet; Refill: 3    4. Tachycardia  -     carvedilol (Coreg) 6.25 MG tablet; Take 1 tablet by mouth 2 (Two) Times a Day With Meals.  Dispense: 180 tablet; Refill: 3    5. Folliculitis  -     cephalexin (Keflex) 500 MG capsule; Take 1 capsule by mouth 4 (Four) Times a Day.  Dispense: 28 capsule; Refill: 0    6. Pruritic rash  -     hydrOXYzine (ATARAX) 25 MG tablet; Take 1 tablet by mouth 3 (Three) Times a Day As Needed for Itching. WILL CAUSE DROWSINESS  Dispense: 30 tablet; Refill: 1  -     triamcinolone (KENALOG) 0.1 %  ointment; Apply 1 Application topically to the appropriate area as directed 2 (Two) Times a Day.  Dispense: 80 g; Refill: 1    Long discussion regarding patient's obesity.  Insurance will not cover GLP-1 agonist and less patient tries diethylpropion.  Discussed risks and benefits of diethylpropion especially in light of patient's hypertension and tachycardia.  Will start once daily dosing.  CSA filled out.  Feliberto reviewed.  UDS at next visit  Referral to bariatric surgery.  Patient also advised to research different types of bariatric surgeries.  Ideally would like to start patient on GLP-1 prior to bariatric surgery  HTN controlled on current medication. Will continue coreg and losartan with 90 day supply  Rash DDx includes bacterial versus viral versus allergic etiology.  Rash seems to be secondary to folliculitis but now widespread.  No history of MRSA.  Will start Keflex.  Advised keeping area dry.  Rash is pruritic and will give short course of hydroxyzine to help with itching.  Due to holiday will give patient prescription of Kenalog if Keflex does not help with rash  Bupropion helping to control depression and patient has noticed improvement in libido since switching to bupropion from Lexapro    Class 3 Severe Obesity (BMI >=40). Obesity-related health conditions include the following: obstructive sleep apnea, hypertension, impaired fasting glucose, GERD, and osteoarthritis. Obesity is unchanged. BMI is is above average; BMI management plan is completed. We discussed low calorie, low carb based diet program, portion control, increasing exercise, joining a fitness center or start home based exercise program, Weight Watchers or other Commercial based weight reduction program, consulting a Bariatric surgeon, management of depression/anxiety/stress to control compensatory eating, decreasing alcohol consumption, pharmacologic options including diethylpropion, and an pratima-based approach such as MyFitness Pal or  Lose It.         Discussion Summary:     Discussed plan of care in detail with patient today. Patient was encouraged to keep me informed of any acute changes, lack of improvement, or any new concerning symptoms.  Patient is also aware of reasons to seek emergent care. Patient verbalized understanding and agrees with plan of care.    This visit was billed based on time.  I spent  70  minutes caring for Akin De Oliveira on this date of service. This time includes time spent by me in the following activities:preparing for the visit, reviewing tests, obtaining and/or reviewing a separately obtained history, performing a medically appropriate examination and/or evaluation , counseling and educating the patient/family/caregiver, ordering medications, tests, or procedures, referring and communicating with other health care professionals , documenting information in the medical record, independently interpreting results and communicating that information with the patient/family/caregiver, and care coordination.    Follow Up  Return in about 1 month (around 1/19/2025) for Recheck obesity.    Please note that portions of this note may have been completed with a voice recognition program.     Bryant Warren MD, MPH  [facility] [place] [facility] [place]

## 2025-03-04 ENCOUNTER — OFFICE VISIT (OUTPATIENT)
Dept: FAMILY MEDICINE CLINIC | Facility: CLINIC | Age: 35
End: 2025-03-04
Payer: COMMERCIAL

## 2025-03-04 VITALS
RESPIRATION RATE: 16 BRPM | HEIGHT: 72 IN | TEMPERATURE: 98.1 F | WEIGHT: 315 LBS | HEART RATE: 101 BPM | OXYGEN SATURATION: 95 % | DIASTOLIC BLOOD PRESSURE: 84 MMHG | SYSTOLIC BLOOD PRESSURE: 128 MMHG | BODY MASS INDEX: 42.66 KG/M2

## 2025-03-04 DIAGNOSIS — M54.50 ACUTE BILATERAL LOW BACK PAIN, UNSPECIFIED WHETHER SCIATICA PRESENT: ICD-10-CM

## 2025-03-04 DIAGNOSIS — I10 PRIMARY HYPERTENSION: ICD-10-CM

## 2025-03-04 DIAGNOSIS — R00.0 TACHYCARDIA: ICD-10-CM

## 2025-03-04 DIAGNOSIS — N39.44 NOCTURNAL ENURESIS: ICD-10-CM

## 2025-03-04 DIAGNOSIS — R32 URINARY INCONTINENCE, UNSPECIFIED TYPE: ICD-10-CM

## 2025-03-04 DIAGNOSIS — E66.813 CLASS 3 SEVERE OBESITY DUE TO EXCESS CALORIES WITH SERIOUS COMORBIDITY AND BODY MASS INDEX (BMI) OF 60.0 TO 69.9 IN ADULT: Primary | ICD-10-CM

## 2025-03-04 DIAGNOSIS — G47.33 OSA (OBSTRUCTIVE SLEEP APNEA): ICD-10-CM

## 2025-03-04 DIAGNOSIS — E66.01 CLASS 3 SEVERE OBESITY DUE TO EXCESS CALORIES WITH SERIOUS COMORBIDITY AND BODY MASS INDEX (BMI) OF 60.0 TO 69.9 IN ADULT: Primary | ICD-10-CM

## 2025-03-04 PROCEDURE — 99215 OFFICE O/P EST HI 40 MIN: CPT | Performed by: FAMILY MEDICINE

## 2025-03-04 RX ORDER — NITROGLYCERIN 0.4 MG/1
0.4 TABLET SUBLINGUAL
Refills: 0 | Status: CANCELLED | OUTPATIENT
Start: 2025-03-04

## 2025-03-04 NOTE — PROGRESS NOTES
"    Office Note     Name: Akin De Oliveira  : 1990   MRN: [id number]     Chief Complaint:  Obesity (Pt is here to follow up on obesity and wants to discus worsening back pain. )    Subjective     History of Present Illness:  Akin De Oliveira is a 34 y.o. male who presents today for obesity and back pain.     Obesity -did not start diethylpropion due to having increased heart rate and concerned that this could further increase heart rate.  Was referred to cardiology from ER visit 2024 and did set up visit but was late and was told he needed to reschedule.  Has not rescheduled yet.  Patient does wear smart watch and heart rate has been between  over the last few weeks.    Back pain -patient works second shift and has to walk approximately three quarters of a mile from back parking lot to where he works.  Has been having more back pain lately when standing for long periods of time.  Does utilize topical medications which do help.  Not taking any NSAIDs or acetaminophen.  Boots are several years old.    Urinary incontinence - at the very end of the visit patient reports urinary incontinence that has seemed to be worsening.  No dysuria, hematuria, penile discharge.    I have reviewed the following portions of the patient's history and these were updated and discussed with the patient as appropriate: past family history, past medical history, past social history, past surgical history, and problem list.     Objective     Vital Signs  /84   Pulse 101   Temp 98.1 °F (36.7 °C) (Oral)   Resp 16   Ht 182.9 cm (72\")   Wt (!) 218 kg (481 lb 3.2 oz)   SpO2 95%   BMI 65.26 kg/m²   Estimated body mass index is 65.26 kg/m² as calculated from the following:    Height as of this encounter: 182.9 cm (72\").    Weight as of this encounter: 218 kg (481 lb 3.2 oz).    Physical Exam  Vitals reviewed.   Constitutional:       General: He is not in acute distress.     Appearance: Normal appearance. He is morbidly obese. " He is not ill-appearing.   HENT:      Head: Normocephalic.   Eyes:      Extraocular Movements: Extraocular movements intact.   Cardiovascular:      Rate and Rhythm: Normal rate.   Pulmonary:      Effort: Pulmonary effort is normal.      Breath sounds: Normal breath sounds.   Neurological:      Mental Status: He is alert and oriented to person, place, and time.   Psychiatric:         Mood and Affect: Mood normal.         Behavior: Behavior normal.               Assessment and Plan     Diagnoses and all orders for this visit:    1. Class 3 severe obesity due to excess calories with serious comorbidity and body mass index (BMI) of 60.0 to 69.9 in adult (Primary)    2. Primary hypertension  -     Basic Metabolic Panel    3. Nocturnal enuresis  -     Ambulatory Referral to Urology    4. Urinary incontinence, unspecified type  -     Cancel: Urinalysis With Microscopic - Urine, Clean Catch  -     Basic Metabolic Panel  -     Hemoglobin A1c  -     PSA DIAGNOSTIC  -     Urinalysis With Culture If Indicated -  -     Ambulatory Referral to Urology    5. SEVERIANO (obstructive sleep apnea)    6. Tachycardia  -     TSH Rfx On Abnormal To Free T4    7. Acute bilateral low back pain, unspecified whether sciatica present    Other orders  -     Microscopic Examination -  -     Please Note      Patient counseled to not start diethylpropion due to tachycardia.  Patient did have questions about diet.  We did discuss starting a diet that patient would be able to stick to an increasing exercise  Much of patient's comorbidities are due to his obesity.  I believe that his tachycardia is due to poor cardiopulmonary fitness.  Advised increased exercise, but patient does need to follow-up with cardiology for evaluation.  Advised patient to go ahead and set up that appointment  Hypertension has been controlled.  Will continue carvedilol and losartan  Patient needs to have evaluation for sleep apnea.  Advised patient to set up this  appointment  Discussed conservative measures to help with low back pain including newer boots, increased exercise, short course of NSAIDs, topical analgesia  Will check labs as per above.  Previous discussions regarding patient's nocturnal enuresis resolved around lifestyle changes that may help including limiting fluid intake prior to bedtime and stopping previously prescribed diuretic.  If no obvious abnormalities from lab work will consider urology referral      Discussion Summary:     Discussed plan of care in detail with patient today. Patient was encouraged to keep me informed of any acute changes, lack of improvement, or any new concerning symptoms.  Patient is also aware of reasons to seek emergent care. Patient verbalized understanding and agrees with plan of care.    This visit was billed based on time.  I spent 40 minutes caring for Akin De Oliveira on this date of service. This time includes time spent by me in the following activities:preparing for the visit, reviewing tests, obtaining and/or reviewing a separately obtained history, performing a medically appropriate examination and/or evaluation , counseling and educating the patient/family/caregiver, ordering medications, tests, or procedures, referring and communicating with other health care professionals , documenting information in the medical record, independently interpreting results and communicating that information with the patient/family/caregiver, and care coordination.    Follow Up  Return in about 3 months (around 6/4/2025) for Recheck obesity.    Please note that portions of this note may have been completed with a voice recognition program.     Bryant Warrne MD, MPH  [facility]

## 2025-03-05 LAB
APPEARANCE UR: CLEAR
BACTERIA #/AREA URNS HPF: NORMAL /[HPF]
BILIRUB UR QL STRIP: NEGATIVE
BUN SERPL-MCNC: 11 MG/DL (ref 6–20)
BUN/CREAT SERPL: 13.4 (ref 7–25)
CALCIUM SERPL-MCNC: 9 MG/DL (ref 8.6–10.5)
CASTS URNS QL MICRO: NORMAL /LPF
CHLORIDE SERPL-SCNC: 101 MMOL/L (ref 98–107)
CO2 SERPL-SCNC: 24 MMOL/L (ref 22–29)
COLOR UR: YELLOW
CREAT SERPL-MCNC: 0.82 MG/DL (ref 0.76–1.27)
EGFRCR SERPLBLD CKD-EPI 2021: 118.2 ML/MIN/1.73
EPI CELLS #/AREA URNS HPF: NORMAL /HPF (ref 0–10)
GLUCOSE SERPL-MCNC: 148 MG/DL (ref 65–99)
GLUCOSE UR QL STRIP: NEGATIVE
HBA1C MFR BLD: 6.8 % (ref 4.8–5.6)
HGB UR QL STRIP: NEGATIVE
KETONES UR QL STRIP: NEGATIVE
LEUKOCYTE ESTERASE UR QL STRIP: NEGATIVE
Lab: NORMAL
MICRO URNS: NORMAL
MICRO URNS: NORMAL
NITRITE UR QL STRIP: NEGATIVE
PH UR STRIP: 6 [PH] (ref 5–7.5)
POTASSIUM SERPL-SCNC: 4.6 MMOL/L (ref 3.5–5.2)
PROT UR QL STRIP: NEGATIVE
PSA SERPL-MCNC: 0.14 NG/ML (ref 0–4)
RBC #/AREA URNS HPF: NORMAL /HPF (ref 0–2)
SODIUM SERPL-SCNC: 137 MMOL/L (ref 136–145)
SP GR UR STRIP: 1.02 (ref 1–1.03)
TSH SERPL DL<=0.005 MIU/L-ACNC: 3.19 UIU/ML (ref 0.27–4.2)
URINALYSIS REFLEX: NORMAL
UROBILINOGEN UR STRIP-MCNC: 1 MG/DL (ref 0.2–1)
WBC #/AREA URNS HPF: NORMAL /HPF (ref 0–5)

## 2025-03-14 ENCOUNTER — OFFICE VISIT (OUTPATIENT)
Dept: CARDIOLOGY | Facility: CLINIC | Age: 35
End: 2025-03-14
Payer: COMMERCIAL

## 2025-03-14 VITALS
BODY MASS INDEX: 41.75 KG/M2 | OXYGEN SATURATION: 97 % | DIASTOLIC BLOOD PRESSURE: 80 MMHG | SYSTOLIC BLOOD PRESSURE: 128 MMHG | WEIGHT: 315 LBS | HEIGHT: 73 IN | HEART RATE: 100 BPM

## 2025-03-14 DIAGNOSIS — E66.813 CLASS 3 SEVERE OBESITY DUE TO EXCESS CALORIES WITH SERIOUS COMORBIDITY AND BODY MASS INDEX (BMI) OF 60.0 TO 69.9 IN ADULT: ICD-10-CM

## 2025-03-14 DIAGNOSIS — R00.0 TACHYCARDIA: Primary | ICD-10-CM

## 2025-03-14 DIAGNOSIS — I10 PRIMARY HYPERTENSION: ICD-10-CM

## 2025-03-14 DIAGNOSIS — E66.01 CLASS 3 SEVERE OBESITY DUE TO EXCESS CALORIES WITH SERIOUS COMORBIDITY AND BODY MASS INDEX (BMI) OF 60.0 TO 69.9 IN ADULT: ICD-10-CM

## 2025-03-14 PROCEDURE — 99204 OFFICE O/P NEW MOD 45 MIN: CPT | Performed by: INTERNAL MEDICINE

## 2025-03-14 PROCEDURE — 93000 ELECTROCARDIOGRAM COMPLETE: CPT | Performed by: INTERNAL MEDICINE

## 2025-03-14 NOTE — ASSESSMENT & PLAN NOTE
We discussed making significant lifestyle modifications.  I think patient needs to lose a significant amount of weight.  I think that his resting heart rate will naturally decrease as he starts becoming more active and losing weight.  He already has mild diabetes and sleep apnea.  I encouraged him to pursue aggressive lifestyle modification and follow-up closely with his primary care for this.

## 2025-03-14 NOTE — ASSESSMENT & PLAN NOTE
Relatively asymptomatic from this but does report persistent rates over 100.  ECG today shows sinus tachycardia  Has occasional palpitations.  No clinical signs of heart failure.  Stress test from 2 years ago was normal.  Recent blood work reviewed and was relatively normal.  Will obtain 7-day Holter to screen for arrhythmias and determine heart rate variability  Ordering echo    Orders:    Holter Monitor - 72 Hour Up To 15 Days; Future    Adult Transthoracic Echo Complete W/ Cont if Necessary Per Protocol; Future    ECG 12 Lead

## 2025-03-14 NOTE — PROGRESS NOTES
[facility]     Outpatient New Patient / Consult Note    Akin De Oliveira  [id number]  2025    Referred By: No ref. provider found    Chief Complaint   Patient presents with    Rapid Heart Rate    Hypertension    Chest Pain    Establish Care     New Patient       Subjective     History of Present Illness:   Akin De Oliveira is a 34 y.o. male with morbid obesity, SEVERIANO, recent diagnosis of diabetes, hypertension and tachycardia who was referred by his primary care due to tachycardia and palpitations.  Patient says this has been a chronic issue for him and he mostly notices it on his watch.  Says that his pulse goes up into the 120s to 140s but he is not all that symptomatic with it.  Does feel an occasional palpitation from time to time but this is very brief and usually self resolving.  He does drive a forklift for living so has a lot of left-sided chest pain due to using his left hand to steer.  Has gone to the ER for this reason previously and was ruled out for ischemia.  He did have a stress test in  which was normal.  His primary care has him carvedilol and losartan for blood pressure and they were wondering if he needed to do anything else to keep his heart rates down    Past Cardiac History and Testin.  Hypertension  2.  Morbid obesity  3.  Obstructive sleep apnea  4.  Chronic palpitation/chest pain  -- Nuclear stress test 10/23: Normal stress test, EF 82%    Review of Systems:  Review of Systems   Constitutional: Negative.    Respiratory: Negative.     Cardiovascular:  Positive for palpitations. Negative for chest pain and leg swelling.   Gastrointestinal: Negative.    Musculoskeletal: Negative.    Neurological: Negative.        Past Medical History:   Diagnosis Date    Anxiety     Currently on Lexapro    Depression     Lexapro    GERD (gastroesophageal reflux disease)     Headache 10 years    Hypertension     Obesity 10 years    Sleep apnea        Past Surgical  History:   Procedure Laterality Date    NEPHRECTOMY PARTIAL      2 years old left       Family History   Problem Relation Age of Onset    Hypertension Mother     Diabetes Mother     Heart failure Mother     Alzheimer's disease Mother     Asthma Mother     Hypertension Maternal Grandfather     Heart attack Maternal Grandfather     Heart failure Maternal Grandfather     Diabetes Maternal Grandmother     Cancer Paternal Grandmother     Diabetes Paternal Grandmother        Social History     Socioeconomic History    Marital status: Single   Tobacco Use    Smoking status: Former     Current packs/day: 0.00     Average packs/day: 1.2 packs/day for 15.3 years (18.0 ttl pk-yrs)     Types: Cigarettes     Start date: 2008     Quit date: 2017     Years since quittin.2     Passive exposure: Past    Smokeless tobacco: Never    Tobacco comments:     I have quit smoking. However, i still use moist snuff in the form of pouches.   Vaping Use    Vaping status: Never Used   Substance and Sexual Activity    Alcohol use: Yes     Alcohol/week: 2.0 standard drinks of alcohol     Types: 2 Cans of beer per week    Drug use: No    Sexual activity: Not Currently     Partners: Female     Birth control/protection: Condom, Abstinence, Birth control pill         Current Outpatient Medications:     buPROPion XL (Wellbutrin XL) 150 MG 24 hr tablet, Take 1 tablet by mouth Daily., Disp: 90 tablet, Rfl: 3    carvedilol (Coreg) 6.25 MG tablet, Take 1 tablet by mouth 2 (Two) Times a Day With Meals., Disp: 180 tablet, Rfl: 3    famotidine (Pepcid) 20 MG tablet, Take 1 tablet by mouth 2 (Two) Times a Day., Disp: 180 tablet, Rfl: 1    hydrOXYzine (ATARAX) 25 MG tablet, Take 1 tablet by mouth 3 (Three) Times a Day As Needed for Itching. WILL CAUSE DROWSINESS, Disp: 30 tablet, Rfl: 1    losartan (Cozaar) 100 MG tablet, Take 1 tablet by mouth Daily., Disp: 90 tablet, Rfl: 3    No Known Allergies       Objective     Vitals:  Vitals:    25  "1451   BP: 128/80   BP Location: Left arm   Patient Position: Sitting   Cuff Size: Adult   Pulse: 100   SpO2: 97%   Weight: (!) 216 kg (476 lb 9.6 oz)   Height: 185.4 cm (73\")       Physical Exam:  Vitals reviewed.   Constitutional:       Appearance: Healthy appearance. Not in distress.   Neck:      Vascular: No JVD.   Pulmonary:      Effort: Pulmonary effort is normal.      Breath sounds: Normal breath sounds.   Cardiovascular:      Normal rate. Regular rhythm. Normal S1. Normal S2.       Murmurs: There is no murmur.      No gallop.  No click. No rub.   Pulses:     Intact distal pulses.   Edema:     Peripheral edema absent.   Abdominal:      General: There is no distension.      Palpations: Abdomen is soft.      Tenderness: There is no abdominal tenderness.   Skin:     General: Skin is warm and dry.         Results Review:   I reviewed the patient's new clinical results.  I reviewed the patient's new imaging results and agree with the interpretation.  I reviewed the patient's other test results and agree with the interpretation  I personally viewed and interpreted the patient's EKG/Telemetry data      ECG 12 Lead    Date/Time: 3/14/2025 3:42 PM  Performed by: Hank Ng MD    Authorized by: Hank Ng MD  Comparison: compared with previous ECG from 9/13/2024  Similar to previous ECG  Rhythm: sinus tachycardia  Rate: tachycardic  BPM: 100  Conduction: conduction normal  ST Segments: ST segments normal  T Waves: T waves normal  QRS axis: normal  Other: no other findings    Clinical impression: normal ECG          Most Recent Labs:  TSH Rfx On Abnormal To Free T4 (03/04/2025 12:26)  Hemoglobin A1c (03/04/2025 12:26)  Basic Metabolic Panel (03/04/2025 12:26)  Lipid Panel (11/19/2024 14:39)  CBC (No Diff) (11/19/2024 14:39)  Comprehensive Metabolic Panel (11/19/2024 14:39)           Assessment & Plan  Tachycardia  Relatively asymptomatic from this but does report persistent rates over 100.  ECG today shows " sinus tachycardia  Has occasional palpitations.  No clinical signs of heart failure.  Stress test from 2 years ago was normal.  Recent blood work reviewed and was relatively normal.  Will obtain 7-day Holter to screen for arrhythmias and determine heart rate variability  Ordering echo    Orders:    Holter Monitor - 72 Hour Up To 15 Days; Future    Adult Transthoracic Echo Complete W/ Cont if Necessary Per Protocol; Future    ECG 12 Lead    Primary hypertension  Control.  Goal less than 130/80  Continue carvedilol and losartan         Class 3 severe obesity due to excess calories with serious comorbidity and body mass index (BMI) of 60.0 to 69.9 in adult  We discussed making significant lifestyle modifications.  I think patient needs to lose a significant amount of weight.  I think that his resting heart rate will naturally decrease as he starts becoming more active and losing weight.  He already has mild diabetes and sleep apnea.  I encouraged him to pursue aggressive lifestyle modification and follow-up closely with his primary care for this.                  Plan   Preventative Cardiology:   Tobacco Cessation: N/A  Obstructive Sleep Apnea Screening: Deffered  AAA Screening: Deffered  Peripheral Arterial Disease Screening: Deffered             Follow Up:   Return in about 6 months (around 9/14/2025).    Thank you for allowing me to participate in the care of your patient. Please to not hesitate to contact me with additional questions or concerns.     Electronically signed by Hank Ng MD, 03/14/25, 4:24 PM EDT.

## 2025-05-29 DIAGNOSIS — K21.00 GASTROESOPHAGEAL REFLUX DISEASE WITH ESOPHAGITIS WITHOUT HEMORRHAGE: ICD-10-CM

## 2025-05-29 RX ORDER — FAMOTIDINE 20 MG/1
20 TABLET, FILM COATED ORAL 2 TIMES DAILY
Qty: 180 TABLET | Refills: 1 | Status: SHIPPED | OUTPATIENT
Start: 2025-05-29

## 2025-05-29 NOTE — TELEPHONE ENCOUNTER
Rx Refill Note  Requested Prescriptions     Signed Prescriptions Disp Refills    famotidine (Pepcid) 20 MG tablet 180 tablet 1     Sig: Take 1 tablet by mouth 2 (Two) Times a Day.     Authorizing Provider: GRACY ROMEO     Ordering User: ROSALIA ROGERS      Last office visit with prescribing clinician: 3/4/2025   Last telemedicine visit with prescribing clinician: Visit date not found   Next office visit with prescribing clinician: 6/4/2025                         Would you like a call back once the refill request has been completed: [] Yes [] No    If the office needs to give you a call back, can they leave a voicemail: [] Yes [] No    Rosalia Rogers MA  05/29/25, 15:59 EDT

## 2025-07-09 ENCOUNTER — HOSPITAL ENCOUNTER (OUTPATIENT)
Dept: CARDIOLOGY | Facility: HOSPITAL | Age: 35
Discharge: HOME | End: 2025-07-09
Admitting: INTERNAL MEDICINE
Payer: COMMERCIAL

## 2025-07-09 VITALS — HEIGHT: 73 IN | BODY MASS INDEX: 41.75 KG/M2 | WEIGHT: 315 LBS

## 2025-07-09 DIAGNOSIS — R00.0 TACHYCARDIA: ICD-10-CM

## 2025-07-09 PROCEDURE — 93306 TTE W/DOPPLER COMPLETE: CPT

## 2025-07-10 LAB
AORTIC DIMENSIONLESS INDEX: 0.68 (DI)
ASCENDING AORTA: 3.8 CM
AV MEAN PRESS GRAD SYS DOP V1V2: 8.3 MMHG
AV VMAX SYS DOP: 194 CM/SEC
BH CV ECHO MEAS - AO MAX PG: 15.1 MMHG
BH CV ECHO MEAS - AO ROOT AREA (BSA CORRECTED): 1.3 CM2
BH CV ECHO MEAS - AO ROOT DIAM: 3.9 CM
BH CV ECHO MEAS - AO V2 VTI: 33.7 CM
BH CV ECHO MEAS - AVA(I,D): 2.37 CM2
BH CV ECHO MEAS - EDV(CUBED): 79.5 ML
BH CV ECHO MEAS - EDV(MOD-SP2): 160 ML
BH CV ECHO MEAS - EDV(MOD-SP4): 187 ML
BH CV ECHO MEAS - EF(MOD-SP2): 56.8 %
BH CV ECHO MEAS - EF(MOD-SP4): 67.6 %
BH CV ECHO MEAS - ESV(CUBED): 8 ML
BH CV ECHO MEAS - ESV(MOD-SP2): 69.2 ML
BH CV ECHO MEAS - ESV(MOD-SP4): 60.5 ML
BH CV ECHO MEAS - FS: 53.5 %
BH CV ECHO MEAS - IVS/LVPW: 1 CM
BH CV ECHO MEAS - IVSD: 1.1 CM
BH CV ECHO MEAS - LA DIMENSION: 4.3 CM
BH CV ECHO MEAS - LAT PEAK E' VEL: 14.5 CM/SEC
BH CV ECHO MEAS - LV DIASTOLIC VOL/BSA (35-75): 60.1 CM2
BH CV ECHO MEAS - LV MASS(C)D: 162.9 GRAMS
BH CV ECHO MEAS - LV MAX PG: 6.8 MMHG
BH CV ECHO MEAS - LV MEAN PG: 3.7 MMHG
BH CV ECHO MEAS - LV SYSTOLIC VOL/BSA (12-30): 19.4 CM2
BH CV ECHO MEAS - LV V1 MAX: 129 CM/SEC
BH CV ECHO MEAS - LV V1 VTI: 23 CM
BH CV ECHO MEAS - LVIDD: 4.3 CM
BH CV ECHO MEAS - LVIDS: 2 CM
BH CV ECHO MEAS - LVOT AREA: 3.5 CM2
BH CV ECHO MEAS - LVOT DIAM: 2.1 CM
BH CV ECHO MEAS - LVPWD: 1.1 CM
BH CV ECHO MEAS - MED PEAK E' VEL: 12.4 CM/SEC
BH CV ECHO MEAS - MV A MAX VEL: 78.6 CM/SEC
BH CV ECHO MEAS - MV DEC SLOPE: 777 CM/SEC2
BH CV ECHO MEAS - MV DEC TIME: 0.15 SEC
BH CV ECHO MEAS - MV E MAX VEL: 114 CM/SEC
BH CV ECHO MEAS - MV E/A: 1.45
BH CV ECHO MEAS - MV P1/2T: 47.1 MSEC
BH CV ECHO MEAS - MVA(P1/2T): 4.7 CM2
BH CV ECHO MEAS - PA ACC TIME: 0.08 SEC
BH CV ECHO MEAS - PA V2 MAX: 131.7 CM/SEC
BH CV ECHO MEAS - RAP SYSTOLE: 15 MMHG
BH CV ECHO MEAS - SV(LVOT): 79.8 ML
BH CV ECHO MEAS - SV(MOD-SP2): 90.8 ML
BH CV ECHO MEAS - SV(MOD-SP4): 126.5 ML
BH CV ECHO MEAS - SVI(LVOT): 25.6 ML/M2
BH CV ECHO MEAS - SVI(MOD-SP2): 29.2 ML/M2
BH CV ECHO MEAS - SVI(MOD-SP4): 40.7 ML/M2
BH CV ECHO MEAS - TAPSE (>1.6): 2.36 CM
BH CV ECHO MEASUREMENTS AVERAGE E/E' RATIO: 8.48
BH CV VAS BP LEFT ARM: NORMAL MMHG
BH CV XLRA - RV BASE: 5 CM
BH CV XLRA - RV LENGTH: 8.7 CM
BH CV XLRA - RV MID: 4.1 CM
BH CV XLRA - TDI S': 16.9 CM/SEC
IVRT: 70 MS
LEFT ATRIUM VOLUME INDEX: 19.8 ML/M2
LV EF 2D ECHO EST: 62 %
LV EF BIPLANE MOD: 62.3 %

## 2025-08-07 ENCOUNTER — OFFICE VISIT (OUTPATIENT)
Dept: PULMONOLOGY | Facility: CLINIC | Age: 35
End: 2025-08-07
Payer: COMMERCIAL

## 2025-08-07 VITALS
WEIGHT: 315 LBS | SYSTOLIC BLOOD PRESSURE: 152 MMHG | RESPIRATION RATE: 22 BRPM | BODY MASS INDEX: 41.75 KG/M2 | OXYGEN SATURATION: 94 % | HEIGHT: 73 IN | HEART RATE: 102 BPM | DIASTOLIC BLOOD PRESSURE: 82 MMHG

## 2025-08-07 DIAGNOSIS — G47.19 EXCESSIVE DAYTIME SLEEPINESS: ICD-10-CM

## 2025-08-07 DIAGNOSIS — E66.01 MORBID OBESITY WITH BMI OF 60.0-69.9, ADULT: ICD-10-CM

## 2025-08-07 DIAGNOSIS — R06.83 SNORING: ICD-10-CM

## 2025-08-07 DIAGNOSIS — G47.33 OBSTRUCTIVE SLEEP APNEA: Primary | ICD-10-CM

## 2025-08-07 PROCEDURE — 99213 OFFICE O/P EST LOW 20 MIN: CPT | Performed by: NURSE PRACTITIONER
